# Patient Record
Sex: MALE | Race: WHITE | NOT HISPANIC OR LATINO | Employment: UNEMPLOYED | URBAN - METROPOLITAN AREA
[De-identification: names, ages, dates, MRNs, and addresses within clinical notes are randomized per-mention and may not be internally consistent; named-entity substitution may affect disease eponyms.]

---

## 2018-03-06 ENCOUNTER — CONVERSION ENCOUNTER (OUTPATIENT)
Dept: GASTROENTEROLOGY | Facility: CLINIC | Age: 33
End: 2018-03-06

## 2018-03-06 ENCOUNTER — OFFICE VISIT CONVERTED (OUTPATIENT)
Dept: GASTROENTEROLOGY | Facility: CLINIC | Age: 33
End: 2018-03-06
Attending: INTERNAL MEDICINE

## 2018-08-06 ENCOUNTER — OFFICE VISIT CONVERTED (OUTPATIENT)
Dept: GASTROENTEROLOGY | Facility: CLINIC | Age: 33
End: 2018-08-06
Attending: INTERNAL MEDICINE

## 2018-08-06 ENCOUNTER — CONVERSION ENCOUNTER (OUTPATIENT)
Dept: GASTROENTEROLOGY | Facility: CLINIC | Age: 33
End: 2018-08-06

## 2018-09-02 ENCOUNTER — APPOINTMENT (OUTPATIENT)
Dept: GENERAL RADIOLOGY | Facility: HOSPITAL | Age: 33
End: 2018-09-02

## 2018-09-02 ENCOUNTER — HOSPITAL ENCOUNTER (EMERGENCY)
Facility: HOSPITAL | Age: 33
Discharge: HOME | End: 2018-09-02
Attending: EMERGENCY MEDICINE | Admitting: EMERGENCY MEDICINE

## 2018-09-02 VITALS
SYSTOLIC BLOOD PRESSURE: 101 MMHG | OXYGEN SATURATION: 100 % | HEART RATE: 102 BPM | RESPIRATION RATE: 18 BRPM | BODY MASS INDEX: 20.73 KG/M2 | DIASTOLIC BLOOD PRESSURE: 58 MMHG | TEMPERATURE: 98.3 F | WEIGHT: 140 LBS | HEIGHT: 69 IN

## 2018-09-02 DIAGNOSIS — R06.02 SHORTNESS OF BREATH: Primary | ICD-10-CM

## 2018-09-02 DIAGNOSIS — F41.9 ANXIETY: ICD-10-CM

## 2018-09-02 LAB
ALBUMIN SERPL-MCNC: 5.2 G/DL (ref 3.5–5.2)
ALBUMIN/GLOB SERPL: 1.6 G/DL
ALP SERPL-CCNC: 103 U/L (ref 39–117)
ALT SERPL W P-5'-P-CCNC: 34 U/L (ref 1–41)
AMPHET+METHAMPHET UR QL: NEGATIVE
ANION GAP SERPL CALCULATED.3IONS-SCNC: 24.5 MMOL/L
AST SERPL-CCNC: 31 U/L (ref 1–40)
BARBITURATES UR QL SCN: NEGATIVE
BASOPHILS # BLD AUTO: 0.06 10*3/MM3 (ref 0–0.2)
BASOPHILS NFR BLD AUTO: 0.6 % (ref 0–1.5)
BENZODIAZ UR QL SCN: NEGATIVE
BILIRUB SERPL-MCNC: 0.9 MG/DL (ref 0.1–1.2)
BILIRUB UR QL STRIP: NEGATIVE
BUN BLD-MCNC: 11 MG/DL (ref 6–20)
BUN/CREAT SERPL: 10.1 (ref 7–25)
CALCIUM SPEC-SCNC: 10.3 MG/DL (ref 8.6–10.5)
CANNABINOIDS SERPL QL: NEGATIVE
CHLORIDE SERPL-SCNC: 97 MMOL/L (ref 98–107)
CLARITY UR: CLEAR
CO2 SERPL-SCNC: 17.5 MMOL/L (ref 22–29)
COCAINE UR QL: NEGATIVE
COLOR UR: YELLOW
CREAT BLD-MCNC: 1.09 MG/DL (ref 0.76–1.27)
D DIMER PPP FEU-MCNC: <0.27 MCGFEU/ML (ref 0–0.49)
DEPRECATED RDW RBC AUTO: 39 FL (ref 37–54)
EOSINOPHIL # BLD AUTO: 0.16 10*3/MM3 (ref 0–0.7)
EOSINOPHIL NFR BLD AUTO: 1.7 % (ref 0.3–6.2)
ERYTHROCYTE [DISTWIDTH] IN BLOOD BY AUTOMATED COUNT: 12.6 % (ref 11.5–14.5)
ETHANOL BLD-MCNC: 19 MG/DL (ref 0–10)
ETHANOL UR QL: 0.02 %
GFR SERPL CREATININE-BSD FRML MDRD: 79 ML/MIN/1.73
GLOBULIN UR ELPH-MCNC: 3.3 GM/DL
GLUCOSE BLD-MCNC: 73 MG/DL (ref 65–99)
GLUCOSE UR STRIP-MCNC: NEGATIVE MG/DL
HCT VFR BLD AUTO: 41.6 % (ref 40.4–52.2)
HGB BLD-MCNC: 14.3 G/DL (ref 13.7–17.6)
HGB UR QL STRIP.AUTO: NEGATIVE
IMM GRANULOCYTES # BLD: 0.03 10*3/MM3 (ref 0–0.03)
IMM GRANULOCYTES NFR BLD: 0.3 % (ref 0–0.5)
KETONES UR QL STRIP: ABNORMAL
LEUKOCYTE ESTERASE UR QL STRIP.AUTO: NEGATIVE
LYMPHOCYTES # BLD AUTO: 1.71 10*3/MM3 (ref 0.9–4.8)
LYMPHOCYTES NFR BLD AUTO: 17.7 % (ref 19.6–45.3)
MCH RBC QN AUTO: 29.4 PG (ref 27–32.7)
MCHC RBC AUTO-ENTMCNC: 34.4 G/DL (ref 32.6–36.4)
MCV RBC AUTO: 85.4 FL (ref 79.8–96.2)
METHADONE UR QL SCN: NEGATIVE
MONOCYTES # BLD AUTO: 0.8 10*3/MM3 (ref 0.2–1.2)
MONOCYTES NFR BLD AUTO: 8.3 % (ref 5–12)
NEUTROPHILS # BLD AUTO: 6.95 10*3/MM3 (ref 1.9–8.1)
NEUTROPHILS NFR BLD AUTO: 71.7 % (ref 42.7–76)
NITRITE UR QL STRIP: NEGATIVE
OPIATES UR QL: NEGATIVE
OXYCODONE UR QL SCN: NEGATIVE
PH UR STRIP.AUTO: <=5 [PH] (ref 5–8)
PLATELET # BLD AUTO: 343 10*3/MM3 (ref 140–500)
PMV BLD AUTO: 10.5 FL (ref 6–12)
POTASSIUM BLD-SCNC: 3.4 MMOL/L (ref 3.5–5.2)
PROT SERPL-MCNC: 8.5 G/DL (ref 6–8.5)
PROT UR QL STRIP: NEGATIVE
RBC # BLD AUTO: 4.87 10*6/MM3 (ref 4.6–6)
SODIUM BLD-SCNC: 139 MMOL/L (ref 136–145)
SP GR UR STRIP: 1.01 (ref 1–1.03)
UROBILINOGEN UR QL STRIP: ABNORMAL
WBC NRBC COR # BLD: 9.68 10*3/MM3 (ref 4.5–10.7)

## 2018-09-02 PROCEDURE — 96361 HYDRATE IV INFUSION ADD-ON: CPT

## 2018-09-02 PROCEDURE — 80307 DRUG TEST PRSMV CHEM ANLYZR: CPT | Performed by: NURSE PRACTITIONER

## 2018-09-02 PROCEDURE — 85025 COMPLETE CBC W/AUTO DIFF WBC: CPT | Performed by: NURSE PRACTITIONER

## 2018-09-02 PROCEDURE — 71045 X-RAY EXAM CHEST 1 VIEW: CPT

## 2018-09-02 PROCEDURE — 94640 AIRWAY INHALATION TREATMENT: CPT

## 2018-09-02 PROCEDURE — 99285 EMERGENCY DEPT VISIT HI MDM: CPT

## 2018-09-02 PROCEDURE — 85379 FIBRIN DEGRADATION QUANT: CPT | Performed by: NURSE PRACTITIONER

## 2018-09-02 PROCEDURE — 80053 COMPREHEN METABOLIC PANEL: CPT | Performed by: NURSE PRACTITIONER

## 2018-09-02 PROCEDURE — 94799 UNLISTED PULMONARY SVC/PX: CPT

## 2018-09-02 PROCEDURE — 81003 URINALYSIS AUTO W/O SCOPE: CPT | Performed by: NURSE PRACTITIONER

## 2018-09-02 PROCEDURE — 96374 THER/PROPH/DIAG INJ IV PUSH: CPT

## 2018-09-02 PROCEDURE — 93010 ELECTROCARDIOGRAM REPORT: CPT | Performed by: INTERNAL MEDICINE

## 2018-09-02 PROCEDURE — 93005 ELECTROCARDIOGRAM TRACING: CPT | Performed by: NURSE PRACTITIONER

## 2018-09-02 RX ORDER — ALBUTEROL SULFATE 90 UG/1
2 AEROSOL, METERED RESPIRATORY (INHALATION) EVERY 4 HOURS PRN
COMMUNITY

## 2018-09-02 RX ORDER — ALBUTEROL SULFATE 2.5 MG/3ML
2.5 SOLUTION RESPIRATORY (INHALATION) ONCE
Status: COMPLETED | OUTPATIENT
Start: 2018-09-02 | End: 2018-09-02

## 2018-09-02 RX ORDER — FAMOTIDINE 10 MG/ML
20 INJECTION, SOLUTION INTRAVENOUS ONCE
Status: COMPLETED | OUTPATIENT
Start: 2018-09-02 | End: 2018-09-02

## 2018-09-02 RX ORDER — SODIUM CHLORIDE 0.9 % (FLUSH) 0.9 %
10 SYRINGE (ML) INJECTION AS NEEDED
Status: DISCONTINUED | OUTPATIENT
Start: 2018-09-02 | End: 2018-09-02 | Stop reason: HOSPADM

## 2018-09-02 RX ORDER — LORAZEPAM 1 MG/1
1 TABLET ORAL ONCE
Status: COMPLETED | OUTPATIENT
Start: 2018-09-02 | End: 2018-09-02

## 2018-09-02 RX ADMIN — ALBUTEROL SULFATE 2.5 MG: 2.5 SOLUTION RESPIRATORY (INHALATION) at 12:28

## 2018-09-02 RX ADMIN — FAMOTIDINE 20 MG: 10 INJECTION INTRAVENOUS at 12:23

## 2018-09-02 RX ADMIN — LORAZEPAM 1 MG: 1 TABLET ORAL at 12:37

## 2018-09-02 RX ADMIN — SODIUM CHLORIDE 1000 ML: 9 INJECTION, SOLUTION INTRAVENOUS at 12:04

## 2018-09-02 NOTE — ED NOTES
"Patient in room 23.  Patient c/o SOB, patient states, \"i just don't feel good, last night I drank alcohol and smoked some weed and after I started feeling like this.\"  Patient denies any other drug use.   Patient appears restless, belching, constantly moving arms/twiching, lip smacking, keeps eyes closed.  Patient VSS, call light within reach, patient alert, cooperative, oriented x4.           Dominga Koo, RN  09/02/18 1222    "

## 2018-09-02 NOTE — ED PROVIDER NOTES
EMERGENCY DEPARTMENT ENCOUNTER    CHIEF COMPLAINT  Chief Complaint: SOA  History given by: patient  History limited by: poor historian  Room Number: 23/23  PMD: Dahlia Del Castillo PA-C      HPI:  Pt is a 33 y.o. male with a history of asthma, who presents complaining of SOA that began yesterday. Pt states that his SOA became worse this morning. Pt denies fever, cough but complains of sinus drainage. Pt states that he is watching his sister's dog and forgot his inhaler at home.    Duration:  2 days  Onset: gradual  Timing: constant  Quality: SOA  Intensity/Severity: moderate to severe  Progression: worsening  Associated Symptoms: sinus drainage  Aggravating Factors: none  Alleviating Factors: none  Treatment before arrival: none    PAST MEDICAL HISTORY  Active Ambulatory Problems     Diagnosis Date Noted   • No Active Ambulatory Problems     Resolved Ambulatory Problems     Diagnosis Date Noted   • No Resolved Ambulatory Problems     Past Medical History:   Diagnosis Date   • Asthma    • Tachycardia        PAST SURGICAL HISTORY  History reviewed. No pertinent surgical history.    FAMILY HISTORY  History reviewed. No pertinent family history.    SOCIAL HISTORY  Social History     Social History   • Marital status: Single     Spouse name: N/A   • Number of children: N/A   • Years of education: N/A     Occupational History   • Not on file.     Social History Main Topics   • Smoking status: Never Smoker   • Smokeless tobacco: Not on file   • Alcohol use Yes   • Drug use: Unknown   • Sexual activity: Not on file     Other Topics Concern   • Not on file     Social History Narrative   • No narrative on file       ALLERGIES  Patient has no known allergies.    REVIEW OF SYSTEMS  Review of Systems   Constitutional: Negative for activity change, appetite change and fever.   HENT: Negative for congestion and sore throat.         Sinus drainage   Eyes: Negative.    Respiratory: Positive for shortness of breath. Negative for cough.     Cardiovascular: Negative for chest pain and leg swelling.   Gastrointestinal: Negative for abdominal pain, diarrhea and vomiting.   Endocrine: Negative.    Genitourinary: Negative for decreased urine volume and dysuria.   Musculoskeletal: Negative for neck pain.   Skin: Negative for rash and wound.   Allergic/Immunologic: Negative.    Neurological: Negative for weakness, numbness and headaches.   Hematological: Negative.    Psychiatric/Behavioral: Negative.    All other systems reviewed and are negative.      PHYSICAL EXAM  ED Triage Vitals   Temp Heart Rate Resp BP SpO2   09/02/18 1155 09/02/18 1131 09/02/18 1152 09/02/18 1131 09/02/18 1131   98.3 °F (36.8 °C) 100 (!) 30 128/83 98 %      Temp src Heart Rate Source Patient Position BP Location FiO2 (%)   09/02/18 1155 09/02/18 1131 -- -- --   Oral Monitor          Physical Exam   Constitutional: He is oriented to person, place, and time. No distress.   Pt has dyspepsia and repetitively rubs his head.   HENT:   Head: Normocephalic and atraumatic.   Eyes: Pupils are equal, round, and reactive to light. EOM are normal.   Neck: Normal range of motion. Neck supple.   Cardiovascular: Regular rhythm and normal heart sounds.  Tachycardia present.    Pulmonary/Chest: Effort normal and breath sounds normal. Tachypnea (hyperventilating) noted. No respiratory distress.   Abdominal: Soft. There is no tenderness. There is no rebound and no guarding.   Musculoskeletal: Normal range of motion. He exhibits no edema.   Neurological: He is alert and oriented to person, place, and time. He has normal sensation and normal strength.   Skin: Skin is warm and dry.   Psychiatric: His mood appears anxious.   Pt has poor eye contact   Nursing note and vitals reviewed.      LAB RESULTS  Lab Results (last 24 hours)     Procedure Component Value Units Date/Time    CBC & Differential [[id number]] Collected:  09/02/18 1203    Specimen:  Blood Updated:  09/02/18 1216    Narrative:       The  following orders were created for panel order CBC & Differential.  Procedure                               Abnormality         Status                     ---------                               -----------         ------                     CBC Auto Differential[[id number]]        Abnormal            Final result                 Please view results for these tests on the individual orders.    Comprehensive Metabolic Panel [[id number]]  (Abnormal) Collected:  09/02/18 1203    Specimen:  Blood from Arm, Left Updated:  09/02/18 1238     Glucose 73 mg/dL      BUN 11 mg/dL      Creatinine 1.09 mg/dL      Sodium 139 mmol/L      Potassium 3.4 (L) mmol/L      Chloride 97 (L) mmol/L      CO2 17.5 (L) mmol/L      Calcium 10.3 mg/dL      Total Protein 8.5 g/dL      Albumin 5.20 g/dL      ALT (SGPT) 34 U/L      AST (SGOT) 31 U/L      Alkaline Phosphatase 103 U/L      Total Bilirubin 0.9 mg/dL      eGFR Non African Amer 79 mL/min/1.73      Globulin 3.3 gm/dL      A/G Ratio 1.6 g/dL      BUN/Creatinine Ratio 10.1     Anion Gap 24.5 mmol/L     D-dimer, Quantitative [[id number]]  (Normal) Collected:  09/02/18 1203    Specimen:  Blood from Arm, Left Updated:  09/02/18 1234     D-Dimer, Quantitative <0.27 MCGFEU/mL     Narrative:       The [employer] D-Dimer test used in conjunction with a clinical pretest probability (PTP) assessment model, has been approved by the FDA to rule out the presence of venous thromboembolism (VTE) in outpatients suspected of deep venous thrombosis (DVT) or pulmonary embolism (PE).     Ethanol [[id number]]  (Abnormal) Collected:  09/02/18 1203    Specimen:  Blood from Arm, Left Updated:  09/02/18 1238     Ethanol 19 (H) mg/dL      Ethanol % 0.019 %     CBC Auto Differential [[id number]]  (Abnormal) Collected:  09/02/18 1203    Specimen:  Blood from Arm, Left Updated:  09/02/18 1216     WBC 9.68 10*3/mm3      RBC 4.87 10*6/mm3      Hemoglobin 14.3 g/dL      Hematocrit 41.6 %      MCV 85.4 fL      MCH 29.4 pg       MCHC 34.4 g/dL      RDW 12.6 %      RDW-SD 39.0 fl      MPV 10.5 fL      Platelets 343 10*3/mm3      Neutrophil % 71.7 %      Lymphocyte % 17.7 (L) %      Monocyte % 8.3 %      Eosinophil % 1.7 %      Basophil % 0.6 %      Immature Grans % 0.3 %      Neutrophils, Absolute 6.95 10*3/mm3      Lymphocytes, Absolute 1.71 10*3/mm3      Monocytes, Absolute 0.80 10*3/mm3      Eosinophils, Absolute 0.16 10*3/mm3      Basophils, Absolute 0.06 10*3/mm3      Immature Grans, Absolute 0.03 10*3/mm3     Urinalysis With Microscopic If Indicated (No Culture) - Urine, Clean Catch [[id number]]  (Abnormal) Collected:  09/02/18 1318    Specimen:  Urine from Urine, Clean Catch Updated:  09/02/18 1351     Color, UA Yellow     Appearance, UA Clear     pH, UA <=5.0     Specific Gravity, UA 1.008     Glucose, UA Negative     Ketones, UA 40 mg/dL (2+) (A)     Bilirubin, UA Negative     Blood, UA Negative     Protein, UA Negative     Leuk Esterase, UA Negative     Nitrite, UA Negative     Urobilinogen, UA 0.2 E.U./dL    Narrative:       Urine microscopic not indicated.    Urine Drug Screen - Urine, Clean Catch [[id number]]  (Normal) Collected:  09/02/18 1318    Specimen:  Urine from Urine, Clean Catch Updated:  09/02/18 1407     Amphet/Methamphet, Screen Negative     Barbiturates Screen, Urine Negative     Benzodiazepine Screen, Urine Negative     Cocaine Screen, Urine Negative     Opiate Screen Negative     THC, Screen, Urine Negative     Methadone Screen, Urine Negative     Oxycodone Screen, Urine Negative    Narrative:       Negative Thresholds For Drugs Screened:     Amphetamines               500 ng/ml   Barbiturates               200 ng/ml   Benzodiazepines            100 ng/ml   Cocaine                    300 ng/ml   Methadone                  300 ng/ml   Opiates                    300 ng/ml   Oxycodone                  100 ng/ml   THC                        50 ng/ml    The Normal Value for all drugs tested is negative. This report  includes final unconfirmed screening results to be used for medical treatment purposes only. Unconfirmed results must not be used for non-medical purposes such as employment or legal testing. Clinical consideration should be applied to any drug of abuse test, particulary when unconfirmed results are used.          I ordered the above labs and reviewed the results    RADIOLOGY  XR Chest 1 View   Final Result   No focal pulmonary consolidation. Follow-up as clinical   indications persist.       This report was finalized on 9/2/2018 12:24 PM by Dr. Sourav Rivera M.D.               I ordered the above noted radiological studies. Interpreted by radiologist. Reviewed by me in PACS.       PROCEDURES  Procedures  EKG           EKG time: 1222  Rhythm/Rate: NSR, 93  P waves and KS: normal  QRS, axis: normal   ST and T waves: normal     Interpreted Contemporaneously by me, independently viewed  No prior for comparison    PROGRESS AND CONSULTS  ED Course as of Sep 02 1437   Sun Sep 02, 2018   1156 To ER complaining of asthma attack, has not used inhaler.  Bizzare behavior.   [EP]      ED Course User Index  [EP] Yelitza Tam, APRN     1213- Discussed the plan to order lab and imaging studies for further evaluation. Pt understands and agrees with the plan, all questions answered.    1216- Ordered Ativan for anxiety.    1430- Rechecked pt. Pt is resting comfortably and appears calmer at this time. Notified pt of his unremarkable lab and imaging results, including the pt's CXR. Discussed the plan to discharge the pt home. Pt understands and agrees with the plan, all questions answered.      MEDICAL DECISION MAKING  Results were reviewed/discussed with the patient and they were also made aware of online access. Pt also made aware that some labs, such as cultures, will not be resulted during ER visit and follow up with PMD is necessary.     MDM  Number of Diagnoses or Management Options  Anxiety:   Shortness of breath:       Amount and/or Complexity of Data Reviewed  Clinical lab tests: ordered and reviewed (UDS is negative, ethanol=19)  Tests in the radiology section of CPT®: ordered and reviewed (CXR shows nothing acute)  Tests in the medicine section of CPT®: ordered and reviewed (See EKG procedure note)  Independent visualization of images, tracings, or specimens: yes    Patient Progress  Patient progress: stable         DIAGNOSIS  Final diagnoses:   Shortness of breath   Anxiety       DISPOSITION  DISCHARGE    Patient discharged in stable condition.    Reviewed implications of results, diagnosis, meds, responsibility to follow up, warning signs and symptoms of possible worsening, potential complications and reasons to return to ER.    Patient/Family voiced understanding of above instructions.    Discussed plan for discharge, as there is no emergent indication for admission. Patient referred to primary care provider for BP management due to today's BP. Pt/family is agreeable and understands need for follow up and repeat testing.  Pt is aware that discharge does not mean that nothing is wrong but it indicates no emergency is present that requires admission and they must continue care with follow-up as given below or physician of their choice.     FOLLOW-UP  Dahlia Del Castillo PA-C  [address] [place] [id number]  [phone]               Medication List      No changes were made to your prescriptions during this visit.           Latest Documented Vital Signs:  As of 2:37 PM  BP- 103/59 HR- 89 Temp- 98.3 °F (36.8 °C) (Oral) O2 sat- 97%    --  Documentation assistance provided by bruce Wallace for Dr. Ardon.  Information recorded by the scrnatasha was done at my direction and has been verified and validated by me.     Carol Wallace  09/02/18 3273       Randolph Ardon MD  09/02/18 9720

## 2018-09-02 NOTE — ED NOTES
"Pt given urinal and asked to provide a sample. Pt. states, \"I will try\".     Maiclo Jacobo RN  09/02/18 1312    "

## 2021-05-16 VITALS
DIASTOLIC BLOOD PRESSURE: 69 MMHG | HEIGHT: 69 IN | SYSTOLIC BLOOD PRESSURE: 105 MMHG | BODY MASS INDEX: 20.03 KG/M2 | WEIGHT: 135.25 LBS

## 2021-05-16 VITALS
HEIGHT: 69 IN | BODY MASS INDEX: 19.59 KG/M2 | SYSTOLIC BLOOD PRESSURE: 118 MMHG | DIASTOLIC BLOOD PRESSURE: 77 MMHG | WEIGHT: 132.25 LBS

## 2021-06-25 ENCOUNTER — HOSPITAL ENCOUNTER (EMERGENCY)
Facility: HOSPITAL | Age: 36
Discharge: HOME | End: 2021-06-26
Attending: EMERGENCY MEDICINE | Admitting: EMERGENCY MEDICINE

## 2021-06-25 DIAGNOSIS — R06.00 DYSPNEA, UNSPECIFIED TYPE: ICD-10-CM

## 2021-06-25 DIAGNOSIS — R00.2 PALPITATION: Primary | ICD-10-CM

## 2021-06-25 DIAGNOSIS — F12.90 MARIJUANA USE: ICD-10-CM

## 2021-06-25 PROCEDURE — 99284 EMERGENCY DEPT VISIT MOD MDM: CPT

## 2021-06-25 PROCEDURE — 93005 ELECTROCARDIOGRAM TRACING: CPT

## 2021-06-25 PROCEDURE — 99283 EMERGENCY DEPT VISIT LOW MDM: CPT

## 2021-06-25 PROCEDURE — 93005 ELECTROCARDIOGRAM TRACING: CPT | Performed by: EMERGENCY MEDICINE

## 2021-06-26 ENCOUNTER — APPOINTMENT (OUTPATIENT)
Dept: GENERAL RADIOLOGY | Facility: HOSPITAL | Age: 36
End: 2021-06-26

## 2021-06-26 VITALS
WEIGHT: 126.98 LBS | DIASTOLIC BLOOD PRESSURE: 75 MMHG | BODY MASS INDEX: 18.81 KG/M2 | TEMPERATURE: 98.6 F | HEART RATE: 62 BPM | HEIGHT: 69 IN | RESPIRATION RATE: 20 BRPM | OXYGEN SATURATION: 95 % | SYSTOLIC BLOOD PRESSURE: 98 MMHG

## 2021-06-26 LAB
ALBUMIN SERPL-MCNC: 4.7 G/DL (ref 3.5–5.2)
ALBUMIN/GLOB SERPL: 2 G/DL
ALP SERPL-CCNC: 62 U/L (ref 39–117)
ALT SERPL W P-5'-P-CCNC: 20 U/L (ref 1–41)
AMPHET+METHAMPHET UR QL: NEGATIVE
ANION GAP SERPL CALCULATED.3IONS-SCNC: 11.9 MMOL/L (ref 5–15)
AST SERPL-CCNC: 24 U/L (ref 1–40)
BARBITURATES UR QL SCN: NEGATIVE
BASOPHILS # BLD AUTO: 0.06 10*3/MM3 (ref 0–0.2)
BASOPHILS NFR BLD AUTO: 0.9 % (ref 0–1.5)
BENZODIAZ UR QL SCN: NEGATIVE
BILIRUB SERPL-MCNC: 0.6 MG/DL (ref 0–1.2)
BUN SERPL-MCNC: 8 MG/DL (ref 6–20)
BUN/CREAT SERPL: 9.4 (ref 7–25)
CALCIUM SPEC-SCNC: 9.7 MG/DL (ref 8.6–10.5)
CANNABINOIDS SERPL QL: POSITIVE
CHLORIDE SERPL-SCNC: 106 MMOL/L (ref 98–107)
CO2 SERPL-SCNC: 23.1 MMOL/L (ref 22–29)
COCAINE UR QL: NEGATIVE
CREAT SERPL-MCNC: 0.85 MG/DL (ref 0.76–1.27)
DEPRECATED RDW RBC AUTO: 40.1 FL (ref 37–54)
EOSINOPHIL # BLD AUTO: 0.08 10*3/MM3 (ref 0–0.4)
EOSINOPHIL NFR BLD AUTO: 1.3 % (ref 0.3–6.2)
ERYTHROCYTE [DISTWIDTH] IN BLOOD BY AUTOMATED COUNT: 12.7 % (ref 12.3–15.4)
GFR SERPL CREATININE-BSD FRML MDRD: 103 ML/MIN/1.73
GLOBULIN UR ELPH-MCNC: 2.3 GM/DL
GLUCOSE SERPL-MCNC: 97 MG/DL (ref 65–99)
HCT VFR BLD AUTO: 39.8 % (ref 37.5–51)
HGB BLD-MCNC: 13.4 G/DL (ref 13–17.7)
IMM GRANULOCYTES # BLD AUTO: 0.01 10*3/MM3 (ref 0–0.05)
IMM GRANULOCYTES NFR BLD AUTO: 0.2 % (ref 0–0.5)
LYMPHOCYTES # BLD AUTO: 1.09 10*3/MM3 (ref 0.7–3.1)
LYMPHOCYTES NFR BLD AUTO: 17.2 % (ref 19.6–45.3)
MAGNESIUM SERPL-MCNC: 2.1 MG/DL (ref 1.6–2.6)
MCH RBC QN AUTO: 29.5 PG (ref 26.6–33)
MCHC RBC AUTO-ENTMCNC: 33.7 G/DL (ref 31.5–35.7)
MCV RBC AUTO: 87.7 FL (ref 79–97)
METHADONE UR QL SCN: NEGATIVE
MONOCYTES # BLD AUTO: 0.55 10*3/MM3 (ref 0.1–0.9)
MONOCYTES NFR BLD AUTO: 8.7 % (ref 5–12)
NEUTROPHILS NFR BLD AUTO: 4.56 10*3/MM3 (ref 1.7–7)
NEUTROPHILS NFR BLD AUTO: 71.7 % (ref 42.7–76)
NRBC BLD AUTO-RTO: 0 /100 WBC (ref 0–0.2)
OPIATES UR QL: NEGATIVE
OXYCODONE UR QL SCN: NEGATIVE
PLATELET # BLD AUTO: 268 10*3/MM3 (ref 140–450)
PMV BLD AUTO: 9.9 FL (ref 6–12)
POTASSIUM SERPL-SCNC: 4.1 MMOL/L (ref 3.5–5.2)
PROT SERPL-MCNC: 7 G/DL (ref 6–8.5)
RBC # BLD AUTO: 4.54 10*6/MM3 (ref 4.14–5.8)
SODIUM SERPL-SCNC: 141 MMOL/L (ref 136–145)
T4 FREE SERPL-MCNC: 1.53 NG/DL (ref 0.93–1.7)
TSH SERPL DL<=0.05 MIU/L-ACNC: 1.5 UIU/ML (ref 0.27–4.2)
WBC # BLD AUTO: 6.35 10*3/MM3 (ref 3.4–10.8)

## 2021-06-26 PROCEDURE — 71045 X-RAY EXAM CHEST 1 VIEW: CPT

## 2021-06-26 PROCEDURE — 80307 DRUG TEST PRSMV CHEM ANLYZR: CPT | Performed by: EMERGENCY MEDICINE

## 2021-06-26 PROCEDURE — 84439 ASSAY OF FREE THYROXINE: CPT | Performed by: EMERGENCY MEDICINE

## 2021-06-26 PROCEDURE — 83735 ASSAY OF MAGNESIUM: CPT | Performed by: EMERGENCY MEDICINE

## 2021-06-26 PROCEDURE — 80053 COMPREHEN METABOLIC PANEL: CPT | Performed by: EMERGENCY MEDICINE

## 2021-06-26 PROCEDURE — 84443 ASSAY THYROID STIM HORMONE: CPT | Performed by: EMERGENCY MEDICINE

## 2021-06-26 PROCEDURE — 85025 COMPLETE CBC W/AUTO DIFF WBC: CPT | Performed by: EMERGENCY MEDICINE

## 2021-06-27 LAB — QT INTERVAL: 359 MS

## 2021-11-16 PROBLEM — K51.90 ULCERATIVE COLITIS (HCC): Status: ACTIVE | Noted: 2021-11-16

## 2021-11-16 PROBLEM — F32.A ANXIETY AND DEPRESSION: Status: ACTIVE | Noted: 2021-11-16

## 2021-11-16 PROBLEM — J45.909 ASTHMA: Status: ACTIVE | Noted: 2021-11-16

## 2021-11-16 PROBLEM — F41.9 ANXIETY AND DEPRESSION: Status: ACTIVE | Noted: 2021-11-16

## 2021-11-16 PROBLEM — G90.A POTS (POSTURAL ORTHOSTATIC TACHYCARDIA SYNDROME): Status: ACTIVE | Noted: 2021-11-16

## 2021-11-16 NOTE — PATIENT INSTRUCTIONS
Palpitations  Palpitations are feelings that your heartbeat is not normal. Your heartbeat may feel like it is:  · Uneven.  · Faster than normal.  · Fluttering.  · Skipping a beat.  This is usually not a serious problem. In some cases, you may need tests to rule out any serious problems.  Follow these instructions at home:  Pay attention to any changes in your condition. Take these actions to help manage your symptoms:  Eating and drinking  · Avoid:  ? Coffee, tea, soft drinks, and energy drinks.  ? Chocolate.  ? Alcohol.  ? Diet pills.  Lifestyle    · Try to lower your stress. These things can help you relax:  ? Yoga.  ? Deep breathing and meditation.  ? Exercise.  ? Using words and images to create positive thoughts (guided imagery).  ? Using your mind to control things in your body (biofeedback).  · Do not use drugs.  · Get plenty of rest and sleep. Keep a regular bed time.    General instructions    · Take over-the-counter and prescription medicines only as told by your doctor.  · Do not use any products that contain nicotine or tobacco, such as cigarettes and e-cigarettes. If you need help quitting, ask your doctor.  · Keep all follow-up visits as told by your doctor. This is important. You may need more tests if palpitations do not go away or get worse.    Contact a doctor if:  · Your symptoms last more than 24 hours.  · Your symptoms occur more often.  Get help right away if you:  · Have chest pain.  · Feel short of breath.  · Have a very bad headache.  · Feel dizzy.  · Pass out (faint).  Summary  · Palpitations are feelings that your heartbeat is uneven or faster than normal. It may feel like your heart is fluttering or skipping a beat.  · Avoid food and drinks that may cause palpitations. These include caffeine, chocolate, and alcohol.  · Try to lower your stress. Do not smoke or use drugs.  · Get help right away if you faint or have chest pain, shortness of breath, a severe headache, or dizziness.  This  information is not intended to replace advice given to you by your health care provider. Make sure you discuss any questions you have with your health care provider.  Document Revised: 01/30/2019 Document Reviewed: 01/30/2019  [employer] Patient Education © 2021 [employer]      Postural Orthostatic Tachycardia Syndrome  Postural orthostatic tachycardia syndrome (POTS) is a group of symptoms that occur when a person stands up after lying down. POTS occurs when less blood than normal flows to the body when you stand up. The reduced blood flow to the body makes the heart beat rapidly.  POTS may be associated with another medical condition, or it may occur on its own.  What are the causes?  The cause of this condition is not known, but many conditions and diseases are associated with it.  What increases the risk?  This condition is more likely to develop in:  · Women 15-50 years old.  · Women who are pregnant.  · Women who are in their period (menstruating).  · People who have certain conditions, such as:  ? Infection from a virus.  ? Attacks of healthy organs by the body's immunity (autoimmune disease).  ? Losing a lot of red blood cells (anemia).  ? Losing too much water in the body (dehydration).  ? An overactive thyroid (hyperthyroidism).  · People who take certain medicines.  · People who have had a major injury.  · People who have had surgery.  What are the signs or symptoms?  The most common symptom of this condition is light-headedness when one stands from a lying or sitting position. Other symptoms may include:  · Feeling a rapid increase in the heartbeat (tachycardia) within 10 minutes of standing up.  · Fainting.  · Weakness.  · Confusion.  · Trembling.  · Shortness of breath.  · Sweating or flushing.  · Headache.  · Chest pain.  · Breathing that is deeper and faster than normal (hyperventilation).  · Nausea.  · Anxiety.  Symptoms may be worse in the morning, and they may be relieved by lying down.  How is  this diagnosed?  This condition is diagnosed based on:  · Your symptoms.  · Your medical history.  · A physical exam.  · Checking your heart rate when you are lying down and after you stand up.  · Checking your blood pressure when you go from lying down to standing up.  · Blood tests to measure hormones that change with blood pressure. The blood tests will be done when you are lying down and when you are standing up.  You may have other tests to check for conditions or diseases that are associated with POTS.  How is this treated?  Treatment for this condition depends on how severe your symptoms are and whether you have any conditions or diseases that are associated with POTS. Treatment may involve:  · Treating any conditions or diseases that are associated with POTS.  · Drinking two glasses of water before getting up from a lying position.  · Eating more salt (sodium).  · Taking medicine to control blood pressure and heart rate (beta-blocker).  · Avoiding certain medicines.  · Starting an exercise program under the supervision of a health care provider.  Follow these instructions at home:  Medicines  · Take over-the-counter and prescription medicines only as told by your health care provider.  · Let your health care provider know about all prescription or over-the-counter medicines. These include herbs, vitamins, and supplements. You may need to stop or adjust some medicines if they cause this condition.  · Talk with your health care provider before starting any new medicines.  Eating and drinking    · Drink enough fluid to keep your urine pale yellow.  · If told by your health care provider, drink two glasses of water before getting up from a lying position.  · Follow instructions from your health care provider about how much sodium you should eat.  · Avoid heavy meals. Eat several small meals a day instead of a few large meals.    General instructions  · Do an aerobic exercise for 20 minutes a day, at least 3 days  a week.  · Ask your health care provider what kinds of exercise are safe for you.  · Do not use any products that contain nicotine or tobacco, such as cigarettes and e-cigarettes. These can interfere with blood flow. If you need help quitting, ask your health care provider.  · Keep all follow-up visits as told by your health care provider. This is important.  Contact a health care provider if:  · Your symptoms do not improve after treatment.  · Your symptoms get worse.  · You develop new symptoms.  Get help right away if:  · You have chest pain.  · You have difficulty breathing.  · You have fainting episodes.  These symptoms may represent a serious problem that is an emergency. Do not wait to see if the symptoms will go away. Get medical help right away. Call your local emergency services (911 in the [place] Do not drive yourself to the hospital.  Summary  · POTS is a condition that can cause light-headedness, fainting, and palpitations when you go from a sitting or lying position to a standing position. It occurs when less blood than normal flows to the body when you stand up.  · Treatment for this condition includes treating any underlying conditions, drinking plenty of water, stopping or changing some medicines, or starting an exercise program.  · Get help right away if you have chest pain, difficulty breathing, or fainting episodes. These may represent a serious problem that is an emergency.  This information is not intended to replace advice given to you by your health care provider. Make sure you discuss any questions you have with your health care provider.  Document Revised: 01/29/2019 Document Reviewed: 01/29/2019  [employer] Patient Education © 2021 [employer]

## 2021-11-17 ENCOUNTER — OFFICE VISIT (OUTPATIENT)
Dept: CARDIOLOGY | Facility: CLINIC | Age: 36
End: 2021-11-17

## 2021-11-17 VITALS
WEIGHT: 124 LBS | DIASTOLIC BLOOD PRESSURE: 74 MMHG | BODY MASS INDEX: 18.37 KG/M2 | SYSTOLIC BLOOD PRESSURE: 111 MMHG | HEART RATE: 90 BPM | HEIGHT: 69 IN

## 2021-11-17 DIAGNOSIS — R00.0 TACHYCARDIA: ICD-10-CM

## 2021-11-17 DIAGNOSIS — G90.A POTS (POSTURAL ORTHOSTATIC TACHYCARDIA SYNDROME): ICD-10-CM

## 2021-11-17 DIAGNOSIS — R00.2 PALPITATIONS: ICD-10-CM

## 2021-11-17 DIAGNOSIS — R06.09 DOE (DYSPNEA ON EXERTION): Primary | ICD-10-CM

## 2021-11-17 PROCEDURE — 99203 OFFICE O/P NEW LOW 30 MIN: CPT | Performed by: INTERNAL MEDICINE

## 2021-11-17 PROCEDURE — 93000 ELECTROCARDIOGRAM COMPLETE: CPT | Performed by: INTERNAL MEDICINE

## 2021-11-17 RX ORDER — VITAMIN B COMPLEX
1 CAPSULE ORAL DAILY
COMMUNITY

## 2021-11-17 RX ORDER — METOPROLOL SUCCINATE 25 MG/1
25 TABLET, EXTENDED RELEASE ORAL DAILY
Qty: 90 TABLET | Refills: 3 | Status: SHIPPED | OUTPATIENT
Start: 2021-11-17 | End: 2022-11-12

## 2021-11-17 NOTE — ASSESSMENT & PLAN NOTE
Suspect from deconditioning and POTS related will check echocardiogram to evaluate for any LV dysfunction

## 2021-11-17 NOTE — ASSESSMENT & PLAN NOTE
Suspect this may be somewhat related to autonomic dysfunction versus true arrhythmia will get 24-hour Holter monitor and echocardiogram for further assessment of any structural heart issues.  In the meantime encourage patient on importance of exercise abstaining from alcohol use and other drugs.  Will changes to lower Toprol to see if this is more effective in controlling his symptoms and titrate up on further as needed

## 2021-11-17 NOTE — PROGRESS NOTES
Chief Complaint  Rapid Heart Rate, Shortness of Breath, and Fatigue      History of Present Illness  Feliberto Santillan presents to [facility] C[facility]   Patient is a 36-year-old gentleman with a previous history of pots who has been having symptoms of tachycardia up in the 150s unprovoked also at times symptoms of dizziness.  Patient has not had any overt syncopal episodes does report symptoms of nausea with the spells as well.  He has had shortness of breath increased with exertion and generalized fatigue problems but no PND orthopnea or lower extremity edema    Past Medical History:   Diagnosis Date   • Abnormal ECG    • Arrhythmia    • Asthma    • Hypertension    • POTS (postural orthostatic tachycardia syndrome)    • Tachycardia          Current Outpatient Medications:   •  albuterol (PROVENTIL HFA;VENTOLIN HFA) 108 (90 Base) MCG/ACT inhaler, Inhale 2 puffs Every 4 (Four) Hours As Needed for Wheezing., Disp: , Rfl:   •  Ascorbic Acid (BG-C PO), Take  by mouth Daily., Disp: , Rfl:   •  B Complex Vitamins (vitamin b complex) capsule capsule, Take  by mouth Daily., Disp: , Rfl:   •  Magnesium Chloride (MAGNESIUM DR PO), Take  by mouth Daily., Disp: , Rfl:   •  VITAMIN D PO, Take  by mouth Daily., Disp: , Rfl:   •  metoprolol succinate XL (TOPROL-XL) 25 MG 24 hr tablet, Take 1 tablet by mouth Daily., Disp: 90 tablet, Rfl: 3    Medications Discontinued During This Encounter   Medication Reason   • CBD (cannabidiol) oral oil Discontinued by another clinician   • ATENOLOL PO      Allergies   Allergen Reactions   • Haloperidol Other (See Comments)     Tardive dyskenesthia  Foaming at the mouth   • Risperidone Unknown - High Severity   • Trazodone Other (See Comments)     Stuffy nose        Social History     Tobacco Use   • Smoking status: Never Smoker   • Smokeless tobacco: Current User     Types: Snuff   Vaping Use   • Vaping Use: Never used   Substance Use Topics   • Alcohol use: Yes      "Comment: 6 PACK BEER PER WEEK   • Drug use: Not Currently     Types: Marijuana   Patient also admitted to using mushrooms recently but does not normally use drugs    Family History   Problem Relation Age of Onset   • Thyroid disease Mother    • Asthma Mother    • Hyperlipidemia Mother    • Diabetes Father    • Hypertension Father    • Heart disease Father         Objective     /74   Pulse 90   Ht 175.3 cm (69\")   Wt 56.2 kg (124 lb)   BMI 18.31 kg/m²       Physical Exam    General Appearance:   · no acute distress  · Alert and oriented x3  HENT:   · lips not cyanotic  · Atraumatic  Neck:  · No jvd   · supple  Respiratory:  · no respiratory distress  · normal breath sounds  · no rales  Cardiovascular:  · Regular rate and rhythm  · no S3, no S4   · no murmur  · no rub  Extremities  · No cyanosis  · lower extremity edema: none    Skin:   · warm, dry  · No rashes    Result Review :     No results found for: PROBNP  CMP    CMP 12/21/20 12/22/20 6/26/21   Glucose 192 (A) 80 97   BUN 11 8 8   Creatinine 1.24 (A) 0.80 0.85   eGFR Non African Am   103   Sodium 137 139 141   Potassium 3.5 3.9 4.1   Chloride 102 108 106   Calcium 9.8 9.1 9.7   Albumin 4.8 4.0 4.70   Total Bilirubin 0.96 0.53 0.6   Alkaline Phosphatase 82 73 62   AST (SGOT) 24 24 24   ALT (SGPT) 16 15 20   (A) Abnormal value            CBC w/diff    CBC w/Diff 12/21/20 12/22/20 6/26/21   WBC 12.16 (A) 10.00 6.35   RBC 4.84 4.26 (A) 4.54   Hemoglobin 14.4 12.6 (A) 13.4   Hematocrit 42.5 37.6 (A) 39.8   MCV 87.8 88.3 87.7   MCH 29.8 29.6 29.5   MCHC 33.9 33.5 33.7   RDW 12.7 12.6 12.7   Platelets 305 243 268   Neutrophil Rel % 62.9 79.3 71.7   Immature Granulocyte Rel %   0.2   Lymphocyte Rel % 24.7 12.6 (A) 17.2 (A)   Monocyte Rel % 8.6 6.9 8.7   Eosinophil Rel % 2.9 0.5 1.3   Basophil Rel % 0.7 0.4 0.9   (A) Abnormal value             Lab Results   Component Value Date    TSH 1.500 06/26/2021      Lab Results   Component Value Date    FREET4 1.53 " 06/26/2021      D-Dimer, Quantitative   Date Value Ref Range Status   09/02/2018 <0.27 0.00 - 0.49 MCGFEU/mL Final     Magnesium   Date Value Ref Range Status   06/26/2021 2.1 1.6 - 2.6 mg/dL Final      No results found for: DIGOXIN   Lab Results   Component Value Date    TROPONINT <0.01 08/23/2020      No results found for: POCTROP(              ECG 12 Lead    Date/Time: 11/17/2021 5:08 PM  Performed by: Rudolph Willis MD  Authorized by: Rudolph Willis MD   Comparison: not compared with previous ECG   Rhythm: sinus rhythm           No results found for this or any previous visit.             The ASCVD Risk score (Windsorjanis DEJESUS Jr., et al., 2013) failed to calculate for the following reasons:    The 2013 ASCVD risk score is only valid for ages 40 to 79     Diagnoses and all orders for this visit:    1. NELSON (dyspnea on exertion) (Primary)  Assessment & Plan:  Suspect from deconditioning and POTS related will check echocardiogram to evaluate for any LV dysfunction    Orders:  -     Holter Monitor - 24 Hour; Future  -     Adult Transthoracic Echo Complete W/ Cont if Necessary Per Protocol; Future    2. Tachycardia  Assessment & Plan:  Suspect this may be somewhat related to autonomic dysfunction versus true arrhythmia will get 24-hour Holter monitor and echocardiogram for further assessment of any structural heart issues.  In the meantime encourage patient on importance of exercise abstaining from alcohol use and other drugs.  Will changes to lower Toprol to see if this is more effective in controlling his symptoms and titrate up on further as needed      3. Palpitations    4. POTS (postural orthostatic tachycardia syndrome)    Other orders  -     metoprolol succinate XL (TOPROL-XL) 25 MG 24 hr tablet; Take 1 tablet by mouth Daily.  Dispense: 90 tablet; Refill: 3          Follow Up     Return in about 6 months (around 5/17/2022).          Patient was given instructions and counseling regarding his condition or for health  maintenance advice. Please see specific information pulled into the AVS if appropriate.

## 2021-11-23 ENCOUNTER — APPOINTMENT (OUTPATIENT)
Dept: GENERAL RADIOLOGY | Facility: HOSPITAL | Age: 36
End: 2021-11-23

## 2021-11-23 ENCOUNTER — HOSPITAL ENCOUNTER (EMERGENCY)
Facility: HOSPITAL | Age: 36
Discharge: HOME | End: 2021-11-23
Attending: EMERGENCY MEDICINE | Admitting: EMERGENCY MEDICINE

## 2021-11-23 VITALS
SYSTOLIC BLOOD PRESSURE: 115 MMHG | WEIGHT: 123.46 LBS | TEMPERATURE: 98.7 F | BODY MASS INDEX: 18.71 KG/M2 | RESPIRATION RATE: 19 BRPM | HEART RATE: 92 BPM | HEIGHT: 68 IN | OXYGEN SATURATION: 100 % | DIASTOLIC BLOOD PRESSURE: 85 MMHG

## 2021-11-23 DIAGNOSIS — E83.52 HYPERCALCEMIA: ICD-10-CM

## 2021-11-23 DIAGNOSIS — I47.1 MULTIFOCAL ATRIAL TACHYCARDIA (HCC): Primary | ICD-10-CM

## 2021-11-23 LAB
ALBUMIN SERPL-MCNC: 5.7 G/DL (ref 3.5–5.2)
ALBUMIN/GLOB SERPL: 1.9 G/DL
ALP SERPL-CCNC: 89 U/L (ref 39–117)
ALT SERPL W P-5'-P-CCNC: 46 U/L (ref 1–41)
AMPHET+METHAMPHET UR QL: NEGATIVE
ANION GAP SERPL CALCULATED.3IONS-SCNC: 15.5 MMOL/L (ref 5–15)
AST SERPL-CCNC: 45 U/L (ref 1–40)
BARBITURATES UR QL SCN: NEGATIVE
BASOPHILS # BLD AUTO: 0.07 10*3/MM3 (ref 0–0.2)
BASOPHILS NFR BLD AUTO: 0.8 % (ref 0–1.5)
BENZODIAZ UR QL SCN: NEGATIVE
BILIRUB SERPL-MCNC: 0.8 MG/DL (ref 0–1.2)
BUN SERPL-MCNC: 9 MG/DL (ref 6–20)
BUN/CREAT SERPL: 9.8 (ref 7–25)
CALCIUM SPEC-SCNC: 11.1 MG/DL (ref 8.6–10.5)
CANNABINOIDS SERPL QL: POSITIVE
CHLORIDE SERPL-SCNC: 100 MMOL/L (ref 98–107)
CO2 SERPL-SCNC: 22.5 MMOL/L (ref 22–29)
COCAINE UR QL: NEGATIVE
CREAT SERPL-MCNC: 0.92 MG/DL (ref 0.76–1.27)
D DIMER PPP FEU-MCNC: <0.17 MG/L (FEU) (ref 0–0.59)
DEPRECATED RDW RBC AUTO: 41.8 FL (ref 37–54)
EOSINOPHIL # BLD AUTO: 0.06 10*3/MM3 (ref 0–0.4)
EOSINOPHIL NFR BLD AUTO: 0.7 % (ref 0.3–6.2)
ERYTHROCYTE [DISTWIDTH] IN BLOOD BY AUTOMATED COUNT: 13 % (ref 12.3–15.4)
ETHANOL BLD-MCNC: <10 MG/DL (ref 0–10)
ETHANOL UR QL: <0.01 %
GFR SERPL CREATININE-BSD FRML MDRD: 94 ML/MIN/1.73
GLOBULIN UR ELPH-MCNC: 3 GM/DL
GLUCOSE SERPL-MCNC: 123 MG/DL (ref 65–99)
HCT VFR BLD AUTO: 47.3 % (ref 37.5–51)
HGB BLD-MCNC: 16.3 G/DL (ref 13–17.7)
HOLD SPECIMEN: NORMAL
HOLD SPECIMEN: NORMAL
IMM GRANULOCYTES # BLD AUTO: 0.03 10*3/MM3 (ref 0–0.05)
IMM GRANULOCYTES NFR BLD AUTO: 0.4 % (ref 0–0.5)
LYMPHOCYTES # BLD AUTO: 0.94 10*3/MM3 (ref 0.7–3.1)
LYMPHOCYTES NFR BLD AUTO: 11 % (ref 19.6–45.3)
MCH RBC QN AUTO: 30.5 PG (ref 26.6–33)
MCHC RBC AUTO-ENTMCNC: 34.5 G/DL (ref 31.5–35.7)
MCV RBC AUTO: 88.4 FL (ref 79–97)
METHADONE UR QL SCN: NEGATIVE
MONOCYTES # BLD AUTO: 0.36 10*3/MM3 (ref 0.1–0.9)
MONOCYTES NFR BLD AUTO: 4.2 % (ref 5–12)
NEUTROPHILS NFR BLD AUTO: 7.07 10*3/MM3 (ref 1.7–7)
NEUTROPHILS NFR BLD AUTO: 82.9 % (ref 42.7–76)
NRBC BLD AUTO-RTO: 0 /100 WBC (ref 0–0.2)
NT-PROBNP SERPL-MCNC: 32.1 PG/ML (ref 0–450)
OPIATES UR QL: NEGATIVE
OXYCODONE UR QL SCN: NEGATIVE
PLATELET # BLD AUTO: 346 10*3/MM3 (ref 140–450)
PMV BLD AUTO: 10 FL (ref 6–12)
POTASSIUM SERPL-SCNC: 3.9 MMOL/L (ref 3.5–5.2)
PROT SERPL-MCNC: 8.7 G/DL (ref 6–8.5)
QT INTERVAL: 319 MS
RBC # BLD AUTO: 5.35 10*6/MM3 (ref 4.14–5.8)
SODIUM SERPL-SCNC: 138 MMOL/L (ref 136–145)
TROPONIN T SERPL-MCNC: <0.01 NG/ML (ref 0–0.03)
WBC NRBC COR # BLD: 8.53 10*3/MM3 (ref 3.4–10.8)
WHOLE BLOOD HOLD SPECIMEN: NORMAL
WHOLE BLOOD HOLD SPECIMEN: NORMAL

## 2021-11-23 PROCEDURE — 93010 ELECTROCARDIOGRAM REPORT: CPT | Performed by: INTERNAL MEDICINE

## 2021-11-23 PROCEDURE — 80307 DRUG TEST PRSMV CHEM ANLYZR: CPT | Performed by: EMERGENCY MEDICINE

## 2021-11-23 PROCEDURE — 82077 ASSAY SPEC XCP UR&BREATH IA: CPT | Performed by: EMERGENCY MEDICINE

## 2021-11-23 PROCEDURE — 85025 COMPLETE CBC W/AUTO DIFF WBC: CPT | Performed by: EMERGENCY MEDICINE

## 2021-11-23 PROCEDURE — 71045 X-RAY EXAM CHEST 1 VIEW: CPT

## 2021-11-23 PROCEDURE — 84484 ASSAY OF TROPONIN QUANT: CPT | Performed by: EMERGENCY MEDICINE

## 2021-11-23 PROCEDURE — 83880 ASSAY OF NATRIURETIC PEPTIDE: CPT | Performed by: EMERGENCY MEDICINE

## 2021-11-23 PROCEDURE — 85379 FIBRIN DEGRADATION QUANT: CPT | Performed by: EMERGENCY MEDICINE

## 2021-11-23 PROCEDURE — 93005 ELECTROCARDIOGRAM TRACING: CPT | Performed by: EMERGENCY MEDICINE

## 2021-11-23 PROCEDURE — 80053 COMPREHEN METABOLIC PANEL: CPT | Performed by: EMERGENCY MEDICINE

## 2021-11-23 PROCEDURE — 99284 EMERGENCY DEPT VISIT MOD MDM: CPT

## 2021-11-23 RX ORDER — SODIUM CHLORIDE 0.9 % (FLUSH) 0.9 %
10 SYRINGE (ML) INJECTION AS NEEDED
Status: DISCONTINUED | OUTPATIENT
Start: 2021-11-23 | End: 2021-11-23 | Stop reason: HOSPADM

## 2021-11-23 RX ADMIN — SODIUM CHLORIDE 1000 ML: 9 INJECTION, SOLUTION INTRAVENOUS at 09:09

## 2021-11-23 RX ADMIN — SODIUM CHLORIDE 1000 ML: 9 INJECTION, SOLUTION INTRAVENOUS at 06:48

## 2022-04-09 ENCOUNTER — HOSPITAL ENCOUNTER (EMERGENCY)
Facility: HOSPITAL | Age: 37
Discharge: HOME | End: 2022-04-10
Attending: EMERGENCY MEDICINE | Admitting: EMERGENCY MEDICINE

## 2022-04-09 ENCOUNTER — APPOINTMENT (OUTPATIENT)
Dept: GENERAL RADIOLOGY | Facility: HOSPITAL | Age: 37
End: 2022-04-09

## 2022-04-09 DIAGNOSIS — K50.111 CROHN'S DISEASE OF COLON WITH RECTAL BLEEDING: ICD-10-CM

## 2022-04-09 DIAGNOSIS — K51.00 PANCOLITIS: Primary | ICD-10-CM

## 2022-04-09 LAB
ALBUMIN SERPL-MCNC: 4.8 G/DL (ref 3.5–5.2)
ALBUMIN/GLOB SERPL: 1.8 G/DL
ALP SERPL-CCNC: 74 U/L (ref 39–117)
ALT SERPL W P-5'-P-CCNC: 18 U/L (ref 1–41)
ANION GAP SERPL CALCULATED.3IONS-SCNC: 14.5 MMOL/L (ref 5–15)
AST SERPL-CCNC: 26 U/L (ref 1–40)
BASOPHILS # BLD AUTO: 0.08 10*3/MM3 (ref 0–0.2)
BASOPHILS NFR BLD AUTO: 0.8 % (ref 0–1.5)
BILIRUB SERPL-MCNC: 0.9 MG/DL (ref 0–1.2)
BUN SERPL-MCNC: 6 MG/DL (ref 6–20)
BUN/CREAT SERPL: 6.2 (ref 7–25)
CALCIUM SPEC-SCNC: 9.8 MG/DL (ref 8.6–10.5)
CHLORIDE SERPL-SCNC: 104 MMOL/L (ref 98–107)
CO2 SERPL-SCNC: 22.5 MMOL/L (ref 22–29)
CREAT SERPL-MCNC: 0.97 MG/DL (ref 0.76–1.27)
DEPRECATED RDW RBC AUTO: 40.9 FL (ref 37–54)
EGFRCR SERPLBLD CKD-EPI 2021: 105.1 ML/MIN/1.73
EOSINOPHIL # BLD AUTO: 0.24 10*3/MM3 (ref 0–0.4)
EOSINOPHIL NFR BLD AUTO: 2.5 % (ref 0.3–6.2)
ERYTHROCYTE [DISTWIDTH] IN BLOOD BY AUTOMATED COUNT: 12.7 % (ref 12.3–15.4)
GLOBULIN UR ELPH-MCNC: 2.6 GM/DL
GLUCOSE SERPL-MCNC: 101 MG/DL (ref 65–99)
HCT VFR BLD AUTO: 40.6 % (ref 37.5–51)
HGB BLD-MCNC: 13.8 G/DL (ref 13–17.7)
HOLD SPECIMEN: NORMAL
HOLD SPECIMEN: NORMAL
IMM GRANULOCYTES # BLD AUTO: 0.02 10*3/MM3 (ref 0–0.05)
IMM GRANULOCYTES NFR BLD AUTO: 0.2 % (ref 0–0.5)
LIPASE SERPL-CCNC: 32 U/L (ref 13–60)
LYMPHOCYTES # BLD AUTO: 1.49 10*3/MM3 (ref 0.7–3.1)
LYMPHOCYTES NFR BLD AUTO: 15.7 % (ref 19.6–45.3)
MCH RBC QN AUTO: 30.1 PG (ref 26.6–33)
MCHC RBC AUTO-ENTMCNC: 34 G/DL (ref 31.5–35.7)
MCV RBC AUTO: 88.6 FL (ref 79–97)
MONOCYTES # BLD AUTO: 0.89 10*3/MM3 (ref 0.1–0.9)
MONOCYTES NFR BLD AUTO: 9.4 % (ref 5–12)
NEUTROPHILS NFR BLD AUTO: 6.79 10*3/MM3 (ref 1.7–7)
NEUTROPHILS NFR BLD AUTO: 71.4 % (ref 42.7–76)
NRBC BLD AUTO-RTO: 0 /100 WBC (ref 0–0.2)
PLATELET # BLD AUTO: 282 10*3/MM3 (ref 140–450)
PMV BLD AUTO: 9.7 FL (ref 6–12)
POTASSIUM SERPL-SCNC: 3.5 MMOL/L (ref 3.5–5.2)
PROT SERPL-MCNC: 7.4 G/DL (ref 6–8.5)
RBC # BLD AUTO: 4.58 10*6/MM3 (ref 4.14–5.8)
SODIUM SERPL-SCNC: 141 MMOL/L (ref 136–145)
TROPONIN T SERPL-MCNC: <0.01 NG/ML (ref 0–0.03)
WBC NRBC COR # BLD: 9.51 10*3/MM3 (ref 3.4–10.8)
WHOLE BLOOD HOLD SPECIMEN: NORMAL
WHOLE BLOOD HOLD SPECIMEN: NORMAL

## 2022-04-09 PROCEDURE — 80053 COMPREHEN METABOLIC PANEL: CPT

## 2022-04-09 PROCEDURE — 93005 ELECTROCARDIOGRAM TRACING: CPT | Performed by: EMERGENCY MEDICINE

## 2022-04-09 PROCEDURE — 99284 EMERGENCY DEPT VISIT MOD MDM: CPT

## 2022-04-09 PROCEDURE — 93005 ELECTROCARDIOGRAM TRACING: CPT

## 2022-04-09 PROCEDURE — 83690 ASSAY OF LIPASE: CPT

## 2022-04-09 PROCEDURE — 96374 THER/PROPH/DIAG INJ IV PUSH: CPT

## 2022-04-09 PROCEDURE — 84484 ASSAY OF TROPONIN QUANT: CPT

## 2022-04-09 PROCEDURE — 85025 COMPLETE CBC W/AUTO DIFF WBC: CPT

## 2022-04-09 PROCEDURE — 71045 X-RAY EXAM CHEST 1 VIEW: CPT

## 2022-04-09 PROCEDURE — 25010000002 METHYLPREDNISOLONE PER 125 MG: Performed by: NURSE PRACTITIONER

## 2022-04-09 PROCEDURE — 93010 ELECTROCARDIOGRAM REPORT: CPT | Performed by: INTERNAL MEDICINE

## 2022-04-09 RX ORDER — SODIUM CHLORIDE 0.9 % (FLUSH) 0.9 %
10 SYRINGE (ML) INJECTION AS NEEDED
Status: DISCONTINUED | OUTPATIENT
Start: 2022-04-09 | End: 2022-04-10 | Stop reason: HOSPADM

## 2022-04-09 RX ORDER — ATENOLOL 25 MG/1
25 TABLET ORAL DAILY
COMMUNITY
End: 2022-11-15 | Stop reason: HOSPADM

## 2022-04-09 RX ORDER — DICYCLOMINE HYDROCHLORIDE 10 MG/1
20 CAPSULE ORAL ONCE
Status: COMPLETED | OUTPATIENT
Start: 2022-04-09 | End: 2022-04-09

## 2022-04-09 RX ORDER — METHYLPREDNISOLONE SODIUM SUCCINATE 125 MG/2ML
125 INJECTION, POWDER, LYOPHILIZED, FOR SOLUTION INTRAMUSCULAR; INTRAVENOUS ONCE
Status: COMPLETED | OUTPATIENT
Start: 2022-04-09 | End: 2022-04-09

## 2022-04-09 RX ADMIN — DICYCLOMINE HYDROCHLORIDE 20 MG: 10 CAPSULE ORAL at 23:47

## 2022-04-09 RX ADMIN — METHYLPREDNISOLONE SODIUM SUCCINATE 125 MG: 125 INJECTION, POWDER, FOR SOLUTION INTRAMUSCULAR; INTRAVENOUS at 23:47

## 2022-04-10 ENCOUNTER — APPOINTMENT (OUTPATIENT)
Dept: CT IMAGING | Facility: HOSPITAL | Age: 37
End: 2022-04-10

## 2022-04-10 VITALS
OXYGEN SATURATION: 96 % | WEIGHT: 122.58 LBS | RESPIRATION RATE: 19 BRPM | SYSTOLIC BLOOD PRESSURE: 134 MMHG | HEART RATE: 64 BPM | DIASTOLIC BLOOD PRESSURE: 90 MMHG | BODY MASS INDEX: 18.16 KG/M2 | HEIGHT: 69 IN | TEMPERATURE: 98.6 F

## 2022-04-10 LAB — QT INTERVAL: 370 MS

## 2022-04-10 PROCEDURE — 74177 CT ABD & PELVIS W/CONTRAST: CPT

## 2022-04-10 PROCEDURE — 25010000002 KETOROLAC TROMETHAMINE PER 15 MG: Performed by: NURSE PRACTITIONER

## 2022-04-10 PROCEDURE — 0 IOPAMIDOL PER 1 ML: Performed by: EMERGENCY MEDICINE

## 2022-04-10 PROCEDURE — 96375 TX/PRO/DX INJ NEW DRUG ADDON: CPT

## 2022-04-10 RX ORDER — DICYCLOMINE HCL 20 MG
20 TABLET ORAL EVERY 6 HOURS PRN
Qty: 30 TABLET | Refills: 0 | Status: SHIPPED | OUTPATIENT
Start: 2022-04-10 | End: 2022-11-12

## 2022-04-10 RX ORDER — HYDROCODONE BITARTRATE AND ACETAMINOPHEN 5; 325 MG/1; MG/1
1 TABLET ORAL 4 TIMES DAILY PRN
Qty: 12 TABLET | Refills: 0 | Status: SHIPPED | OUTPATIENT
Start: 2022-04-10 | End: 2022-11-12

## 2022-04-10 RX ORDER — ONDANSETRON 4 MG/1
4 TABLET, ORALLY DISINTEGRATING ORAL 4 TIMES DAILY PRN
Qty: 12 TABLET | Refills: 0 | Status: SHIPPED | OUTPATIENT
Start: 2022-04-10 | End: 2022-11-12

## 2022-04-10 RX ORDER — PREDNISONE 20 MG/1
40 TABLET ORAL DAILY
Qty: 14 TABLET | Refills: 0 | Status: SHIPPED | OUTPATIENT
Start: 2022-04-10 | End: 2022-04-17

## 2022-04-10 RX ORDER — KETOROLAC TROMETHAMINE 30 MG/ML
30 INJECTION, SOLUTION INTRAMUSCULAR; INTRAVENOUS ONCE
Status: COMPLETED | OUTPATIENT
Start: 2022-04-10 | End: 2022-04-10

## 2022-04-10 RX ADMIN — IOPAMIDOL 100 ML: 755 INJECTION, SOLUTION INTRAVENOUS at 00:36

## 2022-04-10 RX ADMIN — KETOROLAC TROMETHAMINE 30 MG: 30 INJECTION, SOLUTION INTRAMUSCULAR; INTRAVENOUS at 02:12

## 2022-04-10 NOTE — DISCHARGE INSTRUCTIONS
Follow-up with gastroenterologist for further treatment of Crohn's and recent colitis flare.    Take medications as prescribed.    Return for new or worsening symptoms.    Drink plenty of fluids

## 2022-04-10 NOTE — ED PROVIDER NOTES
Time: 04:35 EDT  Arrived by: EMS  Chief Complaint: Diarrhea and blood in stool  History provided by: Patient   History is limited by: N/A    History of Present Illness:  Patient is a 36 y.o. year old male that presents to the emergency department with ab pain and diarrhea for several weeks      Vomiting  The primary symptoms include abdominal pain, diarrhea and hematochezia. Primary symptoms do not include fever, weight loss, nausea, vomiting, melena, hematemesis, jaundice, dysuria, myalgias, arthralgias or rash. The illness began more than 7 days ago (Patient states several weeks unsure exactly how long). The problem has not changed since onset.  The illness is also significant for tenesmus. The illness does not include chills or back pain. Significant associated medical issues include inflammatory bowel disease. Associated medical issues comments: chronic Crohn's.   Diarrhea  The primary symptoms include abdominal pain, diarrhea and hematochezia. Primary symptoms do not include fever, weight loss, nausea, vomiting, melena, hematemesis, jaundice, dysuria, myalgias, arthralgias or rash.   The illness is also significant for tenesmus. The illness does not include chills or back pain. Significant associated medical issues include inflammatory bowel disease. Associated medical issues comments: chronic Crohn's.   Shortness of Breath  Associated symptoms: abdominal pain    Associated symptoms: no chest pain, no cough, no ear pain, no fever, no headaches, no rash, no sore throat and no vomiting        Similar Symptoms Previously: hx crohns    Recently seen: Yes but patient cannot remember when      Patient Care Team  Primary Care Provider: alysa kang  She has seen Dr. Dukes for GI symptoms but not for several years     Past Medical History:     Allergies   Allergen Reactions   • Haloperidol Other (See Comments)     Tardive dyskenesthia  Foaming at the mouth   • Risperidone Unknown - High Severity   • Trazodone Other  (See Comments)     Stuffy nose     Past Medical History:   Diagnosis Date   • Abnormal ECG    • Anxiety    • Arrhythmia    • Asthma    • Hypertension    • POTS (postural orthostatic tachycardia syndrome)    • Tachycardia      History reviewed. No pertinent surgical history.  Family History   Problem Relation Age of Onset   • Thyroid disease Mother    • Asthma Mother    • Hyperlipidemia Mother    • Diabetes Father    • Hypertension Father    • Heart disease Father        Home Medications:  Prior to Admission medications    Medication Sig Start Date End Date Taking? Authorizing Provider   albuterol (PROVENTIL HFA;VENTOLIN HFA) 108 (90 Base) MCG/ACT inhaler Inhale 2 puffs Every 4 (Four) Hours As Needed for Wheezing.    Cici Montenegro MD   Ascorbic Acid (BG-C PO) Take  by mouth Daily.    Cici Montenegro MD   atenolol (TENORMIN) 25 MG tablet Take 25 mg by mouth Daily.    Cici Montenegro MD   B Complex Vitamins (vitamin b complex) capsule capsule Take  by mouth Daily.    Cici Montenegro MD   Magnesium Chloride (MAGNESIUM DR PO) Take  by mouth Daily.    Cici Montenegro MD   metoprolol succinate XL (TOPROL-XL) 25 MG 24 hr tablet Take 1 tablet by mouth Daily. 11/17/21   Rudolph Willis MD   VITAMIN D PO Take  by mouth Daily.    Cici Montenegro MD        Social History:   PT  reports that he has never smoked. His smokeless tobacco use includes snuff. He reports current alcohol use. He reports previous drug use. Drug: Marijuana.    Record Review:  I have reviewed the patient's records in [facility].     Review of Systems  Review of Systems   Constitutional: Negative for chills, fever and weight loss.   HENT: Negative for congestion, ear pain and sore throat.    Eyes: Negative for pain.   Respiratory: Positive for shortness of breath ( Prior to arrival but resolved). Negative for cough and chest tightness.    Cardiovascular: Negative for chest pain.   Gastrointestinal: Positive for abdominal pain,  "blood in stool, diarrhea and hematochezia. Negative for hematemesis, jaundice, melena, nausea and vomiting.   Genitourinary: Negative for dysuria, flank pain and hematuria.   Musculoskeletal: Negative for arthralgias, back pain, joint swelling and myalgias.   Skin: Negative for pallor and rash.   Neurological: Negative for seizures and headaches.   Hematological: Negative.    Psychiatric/Behavioral: Negative.    All other systems reviewed and are negative.       Physical Exam  /90   Pulse 64   Temp 98.6 °F (37 °C) (Oral)   Resp 19   Ht 175.3 cm (69\")   Wt 55.6 kg (122 lb 9.2 oz)   SpO2 96%   BMI 18.10 kg/m²     Physical Exam  Vitals and nursing note reviewed.   Constitutional:       General: He is not in acute distress.     Appearance: Normal appearance. He is not toxic-appearing.   HENT:      Head: Atraumatic.      Mouth/Throat:      Mouth: Mucous membranes are moist.   Eyes:      General: No scleral icterus.  Cardiovascular:      Rate and Rhythm: Normal rate and regular rhythm.      Pulses: Normal pulses.      Heart sounds: Normal heart sounds.   Pulmonary:      Effort: Pulmonary effort is normal. No respiratory distress.      Breath sounds: Normal breath sounds.   Abdominal:      General: Abdomen is flat. Bowel sounds are normal.      Palpations: Abdomen is soft.      Tenderness: There is abdominal tenderness ( Diffuse generalized mild).   Musculoskeletal:         General: Normal range of motion.      Cervical back: Normal range of motion.   Skin:     General: Skin is warm and dry.   Neurological:      Mental Status: He is alert and oriented to person, place, and time. Mental status is at baseline.   Psychiatric:         Behavior: Behavior is agitated.          ED Course  /90   Pulse 64   Temp 98.6 °F (37 °C) (Oral)   Resp 19   Ht 175.3 cm (69\")   Wt 55.6 kg (122 lb 9.2 oz)   SpO2 96%   BMI 18.10 kg/m²   Results for orders placed or performed during the hospital encounter of 04/09/22 "   Comprehensive Metabolic Panel    Specimen: Blood   Result Value Ref Range    Glucose 101 (H) 65 - 99 mg/dL    BUN 6 6 - 20 mg/dL    Creatinine 0.97 0.76 - 1.27 mg/dL    Sodium 141 136 - 145 mmol/L    Potassium 3.5 3.5 - 5.2 mmol/L    Chloride 104 98 - 107 mmol/L    CO2 22.5 22.0 - 29.0 mmol/L    Calcium 9.8 8.6 - 10.5 mg/dL    Total Protein 7.4 6.0 - 8.5 g/dL    Albumin 4.80 3.50 - 5.20 g/dL    ALT (SGPT) 18 1 - 41 U/L    AST (SGOT) 26 1 - 40 U/L    Alkaline Phosphatase 74 39 - 117 U/L    Total Bilirubin 0.9 0.0 - 1.2 mg/dL    Globulin 2.6 gm/dL    A/G Ratio 1.8 g/dL    BUN/Creatinine Ratio 6.2 (L) 7.0 - 25.0    Anion Gap 14.5 5.0 - 15.0 mmol/L    eGFR 105.1 >60.0 mL/min/1.73   Lipase    Specimen: Blood   Result Value Ref Range    Lipase 32 13 - 60 U/L   Troponin    Specimen: Blood   Result Value Ref Range    Troponin T <0.010 0.000 - 0.030 ng/mL   CBC Auto Differential    Specimen: Blood   Result Value Ref Range    WBC 9.51 3.40 - 10.80 10*3/mm3    RBC 4.58 4.14 - 5.80 10*6/mm3    Hemoglobin 13.8 13.0 - 17.7 g/dL    Hematocrit 40.6 37.5 - 51.0 %    MCV 88.6 79.0 - 97.0 fL    MCH 30.1 26.6 - 33.0 pg    MCHC 34.0 31.5 - 35.7 g/dL    RDW 12.7 12.3 - 15.4 %    RDW-SD 40.9 37.0 - 54.0 fl    MPV 9.7 6.0 - 12.0 fL    Platelets 282 140 - 450 10*3/mm3    Neutrophil % 71.4 42.7 - 76.0 %    Lymphocyte % 15.7 (L) 19.6 - 45.3 %    Monocyte % 9.4 5.0 - 12.0 %    Eosinophil % 2.5 0.3 - 6.2 %    Basophil % 0.8 0.0 - 1.5 %    Immature Grans % 0.2 0.0 - 0.5 %    Neutrophils, Absolute 6.79 1.70 - 7.00 10*3/mm3    Lymphocytes, Absolute 1.49 0.70 - 3.10 10*3/mm3    Monocytes, Absolute 0.89 0.10 - 0.90 10*3/mm3    Eosinophils, Absolute 0.24 0.00 - 0.40 10*3/mm3    Basophils, Absolute 0.08 0.00 - 0.20 10*3/mm3    Immature Grans, Absolute 0.02 0.00 - 0.05 10*3/mm3    nRBC 0.0 0.0 - 0.2 /100 WBC   ECG 12 Lead   Result Value Ref Range    QT Interval 370 ms   Green Top (Gel)   Result Value Ref Range    Extra Tube Hold Specimen    Lavender  Top   Result Value Ref Range    Extra Tube Hold Specimen    Gold Top - SST   Result Value Ref Range    Extra Tube Hold Specimen    Light Blue Top   Result Value Ref Range    Extra Tube Hold Specimen      Medications   dicyclomine (BENTYL) capsule 20 mg (20 mg Oral Given 4/9/22 2347)   methylPREDNISolone sodium succinate (SOLU-Medrol) injection 125 mg (125 mg Intravenous Given 4/9/22 2347)   iopamidol (ISOVUE-370) 76 % injection 100 mL (100 mL Intravenous Given 4/10/22 0036)   ketorolac (TORADOL) injection 30 mg (30 mg Intravenous Given 4/10/22 0212)     CT Abdomen Pelvis With Contrast    Result Date: 4/10/2022  Narrative: PROCEDURE: CT ABDOMEN PELVIS W CONTRAST  COMPARISON: 12/21/2020.  INDICATIONS: UNSPECIFIED ABDOMINAL PAIN AND BLOOD IN STOOL TODAY.  TECHNIQUE: After obtaining the patient's consent, 507 CT images were created with non-ionic intravenous contrast material.  No oral contrast agent was administered for the study.  PROTOCOL:   Standard CT imaging protocol performed.    RADIATION:   DLP: 269 mGy*cm.   Automated exposure control was utilized to minimize radiation dose.  FINDINGS: Noncalcified bibasilar pulmonary nodules are seen, which measure about 5 mm or less.  Consider imaging follow-up of the findings to ensure a benign progression if clinically warranted.  No acute infiltrate is seen.  No pleural effusion.  There is diffuse hepatic steatosis.  No hepatomegaly.  No splenomegaly.  There are stable hepatic cysts, which measure 9 mm or less.  No renal/ureteral stones or hydronephrosis or obstructive uropathy.  No acute pyelonephritis.  No urinary bladder calculi are seen.  There is prominent enhancement of the colonic wall with minimal if any mural thickening.  Air-fluid levels are seen throughout the colon.  There may be a mild infectious/inflammatory pancolitis with an associated generalized adynamic ileus.  No mechanical bowel obstruction is suspected.  No pericolonic fluid collections are seen to  suggest abscess.  No obstructing appendicoliths.  No definite acute appendicitis.  No pneumoperitoneum or pneumatosis.  No portal or mesenteric venous gas.  No acute intraperitoneal or retroperitoneal hemorrhage.  No acute fracture or aggressive osseous lesion.  There may be mild subsegmental atelectasis in the lung bases.  No gallstones.  No acute cholecystitis.  No acute pancreatitis.  No adrenal mass.  No other acute findings are appreciated.      Impression:  Mild nonspecific possibly acute-to-subacute infectious/inflammatory pan-colitis is suspected without a mechanical bowel obstruction.  No pneumoperitoneum or pneumatosis.  No portal or mesenteric venous gas is seen.  No pericolonic fluid collections are seen to suggest abscess.  There may be a generalized adynamic ileus.  Consider close interval clinical and imaging follow-up of the findings to ensure a benign progression.  No other acute findings are suspected.    CASEY VANEGAS JR, MD       Electronically Signed and Approved By: CASEY VANEGAS JR, MD on 4/10/2022 at 1:02             XR Chest 1 View    Result Date: 4/9/2022  Narrative: PROCEDURE: XR CHEST 1 VW  COMPARISON: 11/23/2021.  INDICATIONS: TACHYCARDIA; SHORTNESS OF BREATH SINCE LAST NIGHT.  FINDINGS:  A single AP upright portable chest radiograph was performed.  No cardiac enlargement is seen.  No acute infiltrate is appreciated.  No pleural effusion or pneumothorax is identified.  External artifacts obscure detail.  Degenerative changes involve the right glenohumeral joint, seen previously.  To a lesser degree, there may be degenerative changes of left shoulder.  No significant interval change is seen since the prior study.      Impression:   No acute infiltrate is appreciated.      CSAEY VANEGAS JR, MD       Electronically Signed and Approved By: CASEY VANEGAS JR, MD on 4/09/2022 at 21:21             Medical Decision Making:                     MDM  Number of Diagnoses or Management  Options  Crohn's disease of colon with rectal bleeding (HCC)  Pancolitis (HCC)  Diagnosis management comments: The patient is resting comfortably and feels better, is alert and in no distress. Repeat examination is unremarkable and benign; in particular, there's no discomfort at McBurney's point and there is no pulsatile mass. The history, exam, diagnostic testing, and current condition does not suggest acute appendicitis, bowel obstruction, acute cholecystitis, bowel perforation, major gastrointestinal bleeding, severe diverticulitis, abdominal aortic aneurysm, mesenteric ischemia, volvulus, sepsis, or other significant pathology that warrants further testing, continued ED treatment, admission, for surgical evaluation at this point. The vital signs have been stable. The patient does not have uncontrollable pain, intractable vomiting, or other significant symptoms. The patient's condition is stable and appropriate for discharge from the emergency department.         Amount and/or Complexity of Data Reviewed  Clinical lab tests: reviewed and ordered  Tests in the radiology section of CPT®: reviewed and ordered  Tests in the medicine section of CPT®: reviewed and ordered    Risk of Complications, Morbidity, and/or Mortality  Presenting problems: moderate  Diagnostic procedures: moderate  Management options: low    Patient Progress  Patient progress: stable       Final diagnoses:   Pancolitis (HCC)   Crohn's disease of colon with rectal bleeding (HCC)        Disposition:  ED Disposition     ED Disposition   Discharge    Condition   Stable    Comment   --              Jacqui Daily, APRN  04/10/22 0435

## 2022-04-10 NOTE — ED NOTES
PT REFUSED TO GIVE STOOL SAMPLE OR URINE SAMPLE. PT ALSO REFUSED THIS RN TAKING AN OCCULT STOOL SAMPLE.

## 2022-11-11 PROCEDURE — 36415 COLL VENOUS BLD VENIPUNCTURE: CPT

## 2022-11-11 PROCEDURE — 99285 EMERGENCY DEPT VISIT HI MDM: CPT

## 2022-11-12 ENCOUNTER — APPOINTMENT (OUTPATIENT)
Dept: GENERAL RADIOLOGY | Facility: HOSPITAL | Age: 37
End: 2022-11-12

## 2022-11-12 ENCOUNTER — APPOINTMENT (OUTPATIENT)
Dept: CT IMAGING | Facility: HOSPITAL | Age: 37
End: 2022-11-12

## 2022-11-12 ENCOUNTER — HOSPITAL ENCOUNTER (INPATIENT)
Facility: HOSPITAL | Age: 37
LOS: 3 days | Discharge: HOME | End: 2022-11-15
Attending: EMERGENCY MEDICINE

## 2022-11-12 DIAGNOSIS — Z87.19 HISTORY OF CROHN'S DISEASE: ICD-10-CM

## 2022-11-12 DIAGNOSIS — K56.609 SMALL BOWEL OBSTRUCTION: Primary | ICD-10-CM

## 2022-11-12 LAB
ALBUMIN SERPL-MCNC: 4.9 G/DL (ref 3.5–5.2)
ALBUMIN/GLOB SERPL: 1.8 G/DL
ALP SERPL-CCNC: 81 U/L (ref 39–117)
ALT SERPL W P-5'-P-CCNC: 12 U/L (ref 1–41)
ANION GAP SERPL CALCULATED.3IONS-SCNC: 14.3 MMOL/L (ref 5–15)
AST SERPL-CCNC: 23 U/L (ref 1–40)
BASOPHILS # BLD AUTO: 0.08 10*3/MM3 (ref 0–0.2)
BASOPHILS NFR BLD AUTO: 0.6 % (ref 0–1.5)
BILIRUB SERPL-MCNC: 0.5 MG/DL (ref 0–1.2)
BUN SERPL-MCNC: 17 MG/DL (ref 6–20)
BUN/CREAT SERPL: 16.7 (ref 7–25)
CALCIUM SPEC-SCNC: 10.2 MG/DL (ref 8.6–10.5)
CHLORIDE SERPL-SCNC: 100 MMOL/L (ref 98–107)
CO2 SERPL-SCNC: 24.7 MMOL/L (ref 22–29)
CREAT SERPL-MCNC: 1.02 MG/DL (ref 0.76–1.27)
CRP SERPL-MCNC: 0.94 MG/DL (ref 0–0.5)
DEPRECATED RDW RBC AUTO: 40.5 FL (ref 37–54)
EGFRCR SERPLBLD CKD-EPI 2021: 98.3 ML/MIN/1.73
EOSINOPHIL # BLD AUTO: 0.35 10*3/MM3 (ref 0–0.4)
EOSINOPHIL NFR BLD AUTO: 2.4 % (ref 0.3–6.2)
ERYTHROCYTE [DISTWIDTH] IN BLOOD BY AUTOMATED COUNT: 12.7 % (ref 12.3–15.4)
FLUAV AG NPH QL: NEGATIVE
FLUBV AG NPH QL IA: NEGATIVE
GLOBULIN UR ELPH-MCNC: 2.7 GM/DL
GLUCOSE SERPL-MCNC: 90 MG/DL (ref 65–99)
HCT VFR BLD AUTO: 42.4 % (ref 37.5–51)
HGB BLD-MCNC: 14.2 G/DL (ref 13–17.7)
HOLD SPECIMEN: NORMAL
HOLD SPECIMEN: NORMAL
IMM GRANULOCYTES # BLD AUTO: 0.04 10*3/MM3 (ref 0–0.05)
IMM GRANULOCYTES NFR BLD AUTO: 0.3 % (ref 0–0.5)
LIPASE SERPL-CCNC: 23 U/L (ref 13–60)
LYMPHOCYTES # BLD AUTO: 0.9 10*3/MM3 (ref 0.7–3.1)
LYMPHOCYTES NFR BLD AUTO: 6.3 % (ref 19.6–45.3)
MCH RBC QN AUTO: 29.4 PG (ref 26.6–33)
MCHC RBC AUTO-ENTMCNC: 33.5 G/DL (ref 31.5–35.7)
MCV RBC AUTO: 87.8 FL (ref 79–97)
MONOCYTES # BLD AUTO: 0.84 10*3/MM3 (ref 0.1–0.9)
MONOCYTES NFR BLD AUTO: 5.8 % (ref 5–12)
NEUTROPHILS NFR BLD AUTO: 12.15 10*3/MM3 (ref 1.7–7)
NEUTROPHILS NFR BLD AUTO: 84.6 % (ref 42.7–76)
NRBC BLD AUTO-RTO: 0 /100 WBC (ref 0–0.2)
PLATELET # BLD AUTO: 327 10*3/MM3 (ref 140–450)
PMV BLD AUTO: 10.5 FL (ref 6–12)
POTASSIUM SERPL-SCNC: 4.1 MMOL/L (ref 3.5–5.2)
PROT SERPL-MCNC: 7.6 G/DL (ref 6–8.5)
QT INTERVAL: 340 MS
RBC # BLD AUTO: 4.83 10*6/MM3 (ref 4.14–5.8)
SARS-COV-2 RNA PNL SPEC NAA+PROBE: NOT DETECTED
SODIUM SERPL-SCNC: 139 MMOL/L (ref 136–145)
WBC NRBC COR # BLD: 14.36 10*3/MM3 (ref 3.4–10.8)
WHOLE BLOOD HOLD COAG: NORMAL
WHOLE BLOOD HOLD SPECIMEN: NORMAL

## 2022-11-12 PROCEDURE — 83690 ASSAY OF LIPASE: CPT

## 2022-11-12 PROCEDURE — 25010000002 DICYCLOMINE PER 20 MG: Performed by: EMERGENCY MEDICINE

## 2022-11-12 PROCEDURE — 94799 UNLISTED PULMONARY SVC/PX: CPT

## 2022-11-12 PROCEDURE — 86140 C-REACTIVE PROTEIN: CPT | Performed by: INTERNAL MEDICINE

## 2022-11-12 PROCEDURE — 25010000002 HYDROMORPHONE 1 MG/ML SOLUTION: Performed by: INTERNAL MEDICINE

## 2022-11-12 PROCEDURE — 80053 COMPREHEN METABOLIC PANEL: CPT

## 2022-11-12 PROCEDURE — 99222 1ST HOSP IP/OBS MODERATE 55: CPT | Performed by: INTERNAL MEDICINE

## 2022-11-12 PROCEDURE — 0 IOPAMIDOL PER 1 ML: Performed by: EMERGENCY MEDICINE

## 2022-11-12 PROCEDURE — 85025 COMPLETE CBC W/AUTO DIFF WBC: CPT

## 2022-11-12 PROCEDURE — 93005 ELECTROCARDIOGRAM TRACING: CPT | Performed by: INTERNAL MEDICINE

## 2022-11-12 PROCEDURE — 25010000002 METHYLPREDNISOLONE PER 40 MG: Performed by: INTERNAL MEDICINE

## 2022-11-12 PROCEDURE — 74018 RADEX ABDOMEN 1 VIEW: CPT

## 2022-11-12 PROCEDURE — 25010000002 PIPERACILLIN SOD-TAZOBACTAM PER 1 G: Performed by: INTERNAL MEDICINE

## 2022-11-12 PROCEDURE — 36415 COLL VENOUS BLD VENIPUNCTURE: CPT

## 2022-11-12 PROCEDURE — 74177 CT ABD & PELVIS W/CONTRAST: CPT

## 2022-11-12 PROCEDURE — 99223 1ST HOSP IP/OBS HIGH 75: CPT | Performed by: SURGERY

## 2022-11-12 PROCEDURE — 25010000002 ONDANSETRON PER 1 MG: Performed by: EMERGENCY MEDICINE

## 2022-11-12 PROCEDURE — U0004 COV-19 TEST NON-CDC HGH THRU: HCPCS | Performed by: EMERGENCY MEDICINE

## 2022-11-12 PROCEDURE — 87804 INFLUENZA ASSAY W/OPTIC: CPT | Performed by: EMERGENCY MEDICINE

## 2022-11-12 RX ORDER — SODIUM CHLORIDE 0.9 % (FLUSH) 0.9 %
10 SYRINGE (ML) INJECTION AS NEEDED
Status: DISCONTINUED | OUTPATIENT
Start: 2022-11-12 | End: 2022-11-15 | Stop reason: HOSPADM

## 2022-11-12 RX ORDER — ACETAMINOPHEN 325 MG/1
650 TABLET ORAL EVERY 4 HOURS PRN
Status: DISCONTINUED | OUTPATIENT
Start: 2022-11-12 | End: 2022-11-13

## 2022-11-12 RX ORDER — ALBUTEROL SULFATE 2.5 MG/3ML
2.5 SOLUTION RESPIRATORY (INHALATION) EVERY 6 HOURS PRN
Status: DISCONTINUED | OUTPATIENT
Start: 2022-11-12 | End: 2022-11-15 | Stop reason: HOSPADM

## 2022-11-12 RX ORDER — SODIUM CHLORIDE 0.9 % (FLUSH) 0.9 %
10 SYRINGE (ML) INJECTION EVERY 12 HOURS SCHEDULED
Status: DISCONTINUED | OUTPATIENT
Start: 2022-11-12 | End: 2022-11-15 | Stop reason: HOSPADM

## 2022-11-12 RX ORDER — METHYLPREDNISOLONE SODIUM SUCCINATE 40 MG/ML
20 INJECTION, POWDER, LYOPHILIZED, FOR SOLUTION INTRAMUSCULAR; INTRAVENOUS EVERY 8 HOURS
Status: DISCONTINUED | OUTPATIENT
Start: 2022-11-12 | End: 2022-11-15 | Stop reason: HOSPADM

## 2022-11-12 RX ORDER — NITROGLYCERIN 0.4 MG/1
0.4 TABLET SUBLINGUAL
Status: DISCONTINUED | OUTPATIENT
Start: 2022-11-12 | End: 2022-11-15 | Stop reason: HOSPADM

## 2022-11-12 RX ORDER — DICYCLOMINE HYDROCHLORIDE 10 MG/ML
20 INJECTION INTRAMUSCULAR ONCE
Status: COMPLETED | OUTPATIENT
Start: 2022-11-12 | End: 2022-11-12

## 2022-11-12 RX ORDER — SODIUM CHLORIDE 9 MG/ML
40 INJECTION, SOLUTION INTRAVENOUS AS NEEDED
Status: DISCONTINUED | OUTPATIENT
Start: 2022-11-12 | End: 2022-11-15 | Stop reason: HOSPADM

## 2022-11-12 RX ORDER — DEXTROSE AND SODIUM CHLORIDE 5; .45 G/100ML; G/100ML
100 INJECTION, SOLUTION INTRAVENOUS CONTINUOUS
Status: ACTIVE | OUTPATIENT
Start: 2022-11-12 | End: 2022-11-15

## 2022-11-12 RX ORDER — ONDANSETRON 2 MG/ML
4 INJECTION INTRAMUSCULAR; INTRAVENOUS ONCE
Status: COMPLETED | OUTPATIENT
Start: 2022-11-12 | End: 2022-11-12

## 2022-11-12 RX ADMIN — DEXTROSE AND SODIUM CHLORIDE 100 ML/HR: 5; 450 INJECTION, SOLUTION INTRAVENOUS at 12:52

## 2022-11-12 RX ADMIN — TAZOBACTAM SODIUM AND PIPERACILLIN SODIUM 3.38 G: 375; 3 INJECTION, SOLUTION INTRAVENOUS at 13:35

## 2022-11-12 RX ADMIN — Medication 10 ML: at 10:00

## 2022-11-12 RX ADMIN — SODIUM CHLORIDE 1000 ML: 9 INJECTION, SOLUTION INTRAVENOUS at 05:54

## 2022-11-12 RX ADMIN — METHYLPREDNISOLONE SODIUM SUCCINATE 20 MG: 40 INJECTION, POWDER, FOR SOLUTION INTRAMUSCULAR; INTRAVENOUS at 20:40

## 2022-11-12 RX ADMIN — METHYLPREDNISOLONE SODIUM SUCCINATE 20 MG: 40 INJECTION, POWDER, FOR SOLUTION INTRAMUSCULAR; INTRAVENOUS at 13:35

## 2022-11-12 RX ADMIN — Medication 10 ML: at 20:39

## 2022-11-12 RX ADMIN — ONDANSETRON 4 MG: 2 INJECTION INTRAMUSCULAR; INTRAVENOUS at 05:55

## 2022-11-12 RX ADMIN — HYDROMORPHONE HYDROCHLORIDE 0.5 MG: 1 INJECTION, SOLUTION INTRAMUSCULAR; INTRAVENOUS; SUBCUTANEOUS at 12:58

## 2022-11-12 RX ADMIN — TAZOBACTAM SODIUM AND PIPERACILLIN SODIUM 3.38 G: 375; 3 INJECTION, SOLUTION INTRAVENOUS at 20:39

## 2022-11-12 RX ADMIN — DICYCLOMINE HYDROCHLORIDE 20 MG: 20 INJECTION, SOLUTION INTRAMUSCULAR at 05:58

## 2022-11-12 RX ADMIN — IOPAMIDOL 100 ML: 755 INJECTION, SOLUTION INTRAVENOUS at 06:34

## 2022-11-12 NOTE — PLAN OF CARE
Goal Outcome Evaluation:         New admission from ED. Pt A+O x4. Room air. NSR on monitor. NG tube in R nare secured at 60 degrees. Clamped at this time. Pt aware of NPO status. No vomiting or diarrhea at this time. Covid and flu swab sent off to lab and pending.

## 2022-11-12 NOTE — ED PROVIDER NOTES
Time: 6:29 AM EST  Arrived by: private car  Chief Complaint: Abdominal pain  History provided by: Patient  History is limited by: N/A     History of Present Illness:  Patient is a 37 y.o. year old male that presents to the emergency department with abdominal pain.  Patient notes that his abdominal pain for 3 days.  The patient notes his pain is periumbilical.  Patient has nausea no emesis.  The patient's having diarrhea.  The patient is having periodic blood in his stool.  Patient's had no fever, rigors or myalgias.  Patient's urination is normal no frequency urgency or dysuria.  Patient denies any cough, congestion or sore throat patient does note that he has a history of Crohn's and the symptoms are similar to his previous Crohn's exacerbation      Similar Symptoms Previously: Yes with Crohn's  Recently seen: No      Patient Care Team  Primary Care Provider: Dahlia Del Castillo PA-C    Past Medical History:     Allergies   Allergen Reactions   • Haloperidol Other (See Comments)     Tardive dyskenesthia  Foaming at the mouth   • Risperidone Unknown - High Severity   • Trazodone Other (See Comments)     Stuffy nose     Past Medical History:   Diagnosis Date   • Abnormal ECG    • Anxiety    • Arrhythmia    • Asthma    • Hypertension    • POTS (postural orthostatic tachycardia syndrome)    • Tachycardia      History reviewed. No pertinent surgical history.  Family History   Problem Relation Age of Onset   • Thyroid disease Mother    • Asthma Mother    • Hyperlipidemia Mother    • Diabetes Father    • Hypertension Father    • Heart disease Father        Home Medications:  Prior to Admission medications    Medication Sig Start Date End Date Taking? Authorizing Provider   albuterol (PROVENTIL HFA;VENTOLIN HFA) 108 (90 Base) MCG/ACT inhaler Inhale 2 puffs Every 4 (Four) Hours As Needed for Wheezing.    ProviderCici MD   Ascorbic Acid (BG-C PO) Take  by mouth Daily.    ProviderCici MD   atenolol (TENORMIN)  25 MG tablet Take 25 mg by mouth Daily.    Cici Montenegro MD   B Complex Vitamins (vitamin b complex) capsule capsule Take  by mouth Daily.    Cici Montenegro MD   Magnesium Chloride (MAGNESIUM DR PO) Take  by mouth Daily.    Cici Montenegro MD   VITAMIN D PO Take  by mouth Daily.    Cici Montenegro MD   dicyclomine (BENTYL) 20 MG tablet Take 1 tablet by mouth Every 6 (Six) Hours As Needed (ab pain). 4/10/22 11/12/22  Jacqui Daily APRN   HYDROcodone-acetaminophen (NORCO) 5-325 MG per tablet Take 1 tablet by mouth 4 (Four) Times a Day As Needed for Severe Pain . 4/10/22 11/12/22  Chinedu Montano MD   metoprolol succinate XL (TOPROL-XL) 25 MG 24 hr tablet Take 1 tablet by mouth Daily. 11/17/21 11/12/22  Rudolph Willis MD   ondansetron ODT (ZOFRAN-ODT) 4 MG disintegrating tablet Place 1 tablet on the tongue 4 (Four) Times a Day As Needed for Nausea or Vomiting. 4/10/22 11/12/22  Jacqui Daily APRN        Social History:   Social History     Tobacco Use   • Smoking status: Never   • Smokeless tobacco: Current     Types: Snuff   Vaping Use   • Vaping Use: Never used   Substance Use Topics   • Alcohol use: Yes     Comment: 6 PACK BEER PER WEEK   • Drug use: Not Currently     Types: Marijuana          Record Review:  I have reviewed the patient's records in [facility].     Review of Systems:  Review of Systems   Constitutional: Negative for chills, diaphoresis and fever.   HENT: Negative for congestion, postnasal drip, rhinorrhea and sore throat.    Eyes: Negative for photophobia.   Respiratory: Negative for cough, chest tightness and shortness of breath.    Cardiovascular: Negative for chest pain, palpitations and leg swelling.   Gastrointestinal: Positive for abdominal pain, blood in stool, diarrhea and nausea. Negative for vomiting.   Genitourinary: Negative for difficulty urinating, dysuria, flank pain, frequency, hematuria and urgency.   Musculoskeletal: Negative for neck pain and neck stiffness.  "  Skin: Negative for pallor and rash.   Neurological: Negative for dizziness, syncope, weakness, numbness and headaches.   Hematological: Negative for adenopathy. Does not bruise/bleed easily.   Psychiatric/Behavioral: Negative.            Physical Exam:  /78 (BP Location: Left arm, Patient Position: Sitting)   Pulse 95   Temp 98.1 °F (36.7 °C) (Oral)   Resp 16   Ht 175.3 cm (69\")   Wt 57 kg (125 lb 10.6 oz)   SpO2 100%   BMI 18.56 kg/m²     Physical Exam  Vitals and nursing note reviewed.   Constitutional:       General: He is not in acute distress.     Appearance: Normal appearance. He is not ill-appearing, toxic-appearing or diaphoretic.   HENT:      Head: Normocephalic and atraumatic.      Mouth/Throat:      Mouth: Mucous membranes are moist.   Eyes:      Pupils: Pupils are equal, round, and reactive to light.   Cardiovascular:      Rate and Rhythm: Normal rate and regular rhythm.      Pulses: Normal pulses.           Carotid pulses are 2+ on the right side and 2+ on the left side.       Radial pulses are 2+ on the right side and 2+ on the left side.        Femoral pulses are 2+ on the right side and 2+ on the left side.       Popliteal pulses are 2+ on the right side and 2+ on the left side.        Dorsalis pedis pulses are 2+ on the right side and 2+ on the left side.        Posterior tibial pulses are 2+ on the right side and 2+ on the left side.      Heart sounds: Normal heart sounds. No murmur heard.  Pulmonary:      Effort: Pulmonary effort is normal. No accessory muscle usage, respiratory distress or retractions.      Breath sounds: Normal breath sounds. No wheezing, rhonchi or rales.   Abdominal:      General: Abdomen is flat. There is no distension.      Palpations: Abdomen is soft. There is no fluid wave, mass or pulsatile mass.      Tenderness: There is abdominal tenderness in the periumbilical area. There is no right CVA tenderness, left CVA tenderness, guarding or rebound. Negative " signs include Breaux's sign and McBurney's sign.      Comments: No rigidity   Musculoskeletal:         General: No swelling, tenderness or deformity.      Cervical back: Neck supple. No tenderness.      Right lower leg: No edema.      Left lower leg: No edema.   Skin:     General: Skin is warm and dry.      Capillary Refill: Capillary refill takes less than 2 seconds.      Coloration: Skin is not jaundiced or pale.      Findings: No erythema.   Neurological:      General: No focal deficit present.      Mental Status: He is alert and oriented to person, place, and time. Mental status is at baseline.      Sensory: No sensory deficit.      Motor: No weakness.   Psychiatric:         Mood and Affect: Mood normal.         Behavior: Behavior normal.                Medications in the Emergency Department:  Medications   sodium chloride 0.9 % flush 10 mL (has no administration in time range)   sodium chloride 0.9 % flush 10 mL (has no administration in time range)   sodium chloride 0.9 % bolus 1,000 mL (has no administration in time range)   sodium chloride 0.9 % bolus 1,000 mL (1,000 mL Intravenous New Bag 11/12/22 0554)   ondansetron (ZOFRAN) injection 4 mg (4 mg Intravenous Given 11/12/22 0555)   dicyclomine (BENTYL) injection 20 mg (20 mg Intramuscular Given 11/12/22 0558)   iopamidol (ISOVUE-370) 76 % injection 100 mL (100 mL Intravenous Given 11/12/22 0634)        Labs  Lab Results (last 24 hours)     Procedure Component Value Units Date/Time    CBC & Differential [[id number]]  (Abnormal) Collected: 11/12/22 0116    Specimen: Blood Updated: 11/12/22 0125    Narrative:      The following orders were created for panel order CBC & Differential.  Procedure                               Abnormality         Status                     ---------                               -----------         ------                     CBC Auto Differential[[id number]]        Abnormal            Final result                 Please view results  for these tests on the individual orders.    Comprehensive Metabolic Panel [[id number]] Collected: 11/12/22 0116    Specimen: Blood Updated: 11/12/22 0159     Glucose 90 mg/dL      BUN 17 mg/dL      Creatinine 1.02 mg/dL      Sodium 139 mmol/L      Potassium 4.1 mmol/L      Comment: Slight hemolysis detected by analyzer. Results may be affected.        Chloride 100 mmol/L      CO2 24.7 mmol/L      Calcium 10.2 mg/dL      Total Protein 7.6 g/dL      Albumin 4.90 g/dL      ALT (SGPT) 12 U/L      AST (SGOT) 23 U/L      Alkaline Phosphatase 81 U/L      Total Bilirubin 0.5 mg/dL      Globulin 2.7 gm/dL      A/G Ratio 1.8 g/dL      BUN/Creatinine Ratio 16.7     Anion Gap 14.3 mmol/L      eGFR 98.3 mL/min/1.73      Comment: National Kidney Foundation and American Society of Nephrology (ASN) Task Force recommended calculation based on the Chronic Kidney Disease Epidemiology Collaboration (CKD-EPI) equation refit without adjustment for race.       Narrative:      GFR Normal >60  Chronic Kidney Disease <60  Kidney Failure <15      Lipase [[id number]]  (Normal) Collected: 11/12/22 0116    Specimen: Blood Updated: 11/12/22 0159     Lipase 23 U/L     CBC Auto Differential [[id number]]  (Abnormal) Collected: 11/12/22 0116    Specimen: Blood Updated: 11/12/22 0125     WBC 14.36 10*3/mm3      RBC 4.83 10*6/mm3      Hemoglobin 14.2 g/dL      Hematocrit 42.4 %      MCV 87.8 fL      MCH 29.4 pg      MCHC 33.5 g/dL      RDW 12.7 %      RDW-SD 40.5 fl      MPV 10.5 fL      Platelets 327 10*3/mm3      Neutrophil % 84.6 %      Lymphocyte % 6.3 %      Monocyte % 5.8 %      Eosinophil % 2.4 %      Basophil % 0.6 %      Immature Grans % 0.3 %      Neutrophils, Absolute 12.15 10*3/mm3      Lymphocytes, Absolute 0.90 10*3/mm3      Monocytes, Absolute 0.84 10*3/mm3      Eosinophils, Absolute 0.35 10*3/mm3      Basophils, Absolute 0.08 10*3/mm3      Immature Grans, Absolute 0.04 10*3/mm3      nRBC 0.0 /100 WBC            Imaging:  CT Abdomen Pelvis  With Contrast   Final Result    There is a suspected mechanical small bowel obstruction with a transition zone in the    right lower quadrant, probably involving distal ileum.  It may be related to a nonspecific    infectious/inflammatory enteritis (such is acute exacerbation of Crohn's enteritis).  Probably    minimal, if any, mural thickening of the small bowel is appreciated, however.  No definite CT    evidence of an ischemic enteritis.  No pneumoperitoneum or pneumatosis.  No portal or mesenteric    venous gas.  No definite intraperitoneal or retroperitoneal abscess.  No acute appendicitis.     Consider close interval clinical and imaging follow-up.  The patient may benefit from    administration of oral contrast agent and follow-up abdominal/pelvic CT examination and/or plain    radiographs (KUBs) if clinically warranted.  Please see above comments for further detail.             Please note that portions of this note were completed with a voice recognition program.       CASEY VANEGAS JR, MD          Electronically Signed and Approved By: CASEY VANEGAS JR, MD on 11/12/2022 at 6:56                               Procedures:  Procedures    Progress                            Medical Decision Making:  MDM  Number of Diagnoses or Management Options  History of Crohn's disease  Small bowel obstruction (HCC)  Diagnosis management comments:     The patient refused the COVID and flu swabs.       Amount and/or Complexity of Data Reviewed  Clinical lab tests: reviewed  Tests in the radiology section of CPT®: reviewed  Tests in the medicine section of CPT®: reviewed  Discuss the patient with other providers: yes (I discussed case with the general surgeon, Dr. Earl.  She agrees with the NG tube.  She will see the patient in consultation    I discussed the case with Dr. Saunders, gastroenterologist.  He will see the patient in consultation.  He request that steroids be held until he sees the patient.    I discussed  the case with Dr. LENNY Ennis.  She is agree to admit the patient to the hospital.  She will consult both gastroenterology and general surgery)               Final diagnoses:   Small bowel obstruction (HCC)   History of Crohn's disease        Disposition:  ED Disposition     ED Disposition   Decision to Admit    Condition   --    Comment   Level of Care: Telemetry [5]   Diagnosis: Small bowel obstruction (HCC) [194346]   Admitting Physician: ROXY ENNIS [[id number]]   Attending Physician: ROXY ENNIS [[id number]]   Isolate for COVID?: No [0]   Certification: I Certify That Inpatient Hospital Services Are Medically Necessary For Greater Than 2 Midnights               This medical record created using voice recognition software.    DO Christina Duong Scott, DO  11/12/22 2029

## 2022-11-12 NOTE — H&P
[facility]   HISTORY AND PHYSICAL    Patient Name: Feliberto Santillan  : 1985  MRN: [id number]  Primary Care Physician:  Dahlia Del Castillo PA-C  Date of admission: 2022    Subjective   Subjective     Chief Complaint: Crohn's disease, small bowel obstruction    HPI:    Feliberto Santillan is a 37 y.o. male who has a known diagnosis of Crohn's disease was admitted with abdomen pain for 3 days in the periumbilical region.  He has no nausea or vomiting.  He has been having diarrhea and periodic blood in his stool due to Crohn's.  He has not had a BM or passed any gas.  History of SVT for which he is on atenolol and takes vitamins and electrolytes.     Review of Systems   Review of Systems   Constitutional: Positive for fatigue. Activity change: in bed.   HENT: Negative.    Eyes: Negative.    Respiratory: Negative.    Cardiovascular: Negative.    Gastrointestinal: Positive for abdominal distention, abdominal pain and constipation.   Endocrine: Negative.    Genitourinary: Negative.    Musculoskeletal: Negative.    Skin: Negative.    Allergic/Immunologic: Negative.    Neurological: Negative.    Hematological: Negative.    Psychiatric/Behavioral: Negative.        Personal History     Past Medical History:   Diagnosis Date   • Abnormal ECG    • Anxiety    • Arrhythmia    • Asthma    • Hypertension    • POTS (postural orthostatic tachycardia syndrome)    • Tachycardia        History reviewed. No pertinent surgical history.    Family History: family history includes Asthma in his mother; Diabetes in his father; Heart disease in his father; Hyperlipidemia in his mother; Hypertension in his father; Thyroid disease in his mother. Otherwise pertinent FHx was reviewed and not pertinent to current issue.    Social History:  reports that he has never smoked. His smokeless tobacco use includes snuff. He reports current alcohol use. He reports that he does not currently use drugs after having used the following drugs:  Marijuana.    Home Medications:  Ascorbic Acid, Magnesium Chloride, Vitamin D, albuterol sulfate HFA, atenolol, and vitamin b complex      Allergies:  Allergies   Allergen Reactions   • Haloperidol Other (See Comments)     Tardive dyskenesthia  Foaming at the mouth   • Risperidone Unknown - High Severity   • Trazodone Other (See Comments)     Stuffy nose       Objective   Objective     Vitals:   Temp:  [98.1 °F (36.7 °C)] 98.1 °F (36.7 °C)  Heart Rate:  [95] 95  Resp:  [16] 16  BP: (126)/(78) 126/78  Physical Exam    Constitutional: Awake, alert   Eyes: PERRLA, sclerae anicteric, no conjunctival injection   HENT: NCAT, mucous membranes moist   Neck: Supple, no thyromegaly, no lymphadenopathy, trachea midline   Respiratory: Clear to auscultation bilaterally, nonlabored respirations    Cardiovascular: RRR, no murmurs, rubs, or gallops, palpable pedal pulses bilaterally   Gastrointestinal: Positive bowel sounds, soft, nontender, nondistended.  Minimal tenderness   Musculoskeletal: No bilateral ankle edema, no clubbing or cyanosis to extremities   Psychiatric: Appropriate affect, cooperative   Neurologic: Oriented x 3, strength symmetric in all extremities, Cranial Nerves grossly intact to confrontation, speech clear   Skin: No rashes     Result Review    Result Review:  I have personally reviewed the results from the time of this admission to 11/12/2022 08:00 EST and agree with these findings:  [x]  Laboratory  []  Microbiology  [x]  Radiology  []  EKG/Telemetry   []  Cardiology/Vascular   []  Pathology  []  Old records  []  Other:  Most notable findings include: Crohn's colitis and small bowel obstruction  Assessment & Plan   Assessment / Plan     Brief Patient Summary:  Feliberto Santillan is a 37 y.o. male who has a known diagnosis of Crohn's colitis.  He came in with similar pains like his previous episodes of colitis.  X-ray abdomen showed CT abdomen showed persistent gaseous distension of small intestine due to  small bowel obstruction.  CT abdomen and pelvis done shows suspected mechanical small bowel obstruction with a transition zone in the right lower quadrant probably involving distal ileum.  May be related to nonspecific infectious inflammatory enteritis such as acute exacerbation of Crohn's enteritis.  Minimal if any mural thickening of the small bowel is appreciated no definite abscess    Active Hospital Problems:  Active Hospital Problems    Diagnosis    • **Small bowel obstruction (HCC)      Plan:   Patient admitted, n.p.o.   Surgery consult Dr. Earl  GI consult Dr. Saunders who started him on Solu-Medrol 20 mg IV every 8 hours  Check CRP  IV fluids  IV pain meds  IV antibiotics  IV metoprolol as needed    DVT prophylaxis:  No DVT prophylaxis order currently exists.    CODE STATUS:    Level Of Support Discussed With: Patient  Code Status (Patient has no pulse and is not breathing): CPR (Attempt to Resuscitate)  Medical Interventions (Patient has pulse or is breathing): Full Support    Admission Status:  I believe this patient meets inpatient status.    Electronically signed by Yasmani Garcia MD, 11/12/22, 8:00 AM EST.

## 2022-11-12 NOTE — CONSULTS
Chief Complaint: Abdominal Pain and Vomiting    Subjective         History of Present Illness  Feliberto Santillan is a 37 y.o. male presents to [facility] 4TH FLOOR MEDICAL TELEMETRY UNIT to be seen for abdominal pain and diarrhea per ER.  He has a hx of crohns and has been off his maintenance medications and was seen in ER and ct scan showed SBO with transition point in RLQ near terminal ileum.  His results are shown below:    Narrative & Impression   PROCEDURE:  CT ABDOMEN PELVIS W CONTRAST     COMPARISONS:           12/21/2020; 4/10/2022.     INDICATIONS:  37-year-old male generalized abdominal pain & vomiting for 1 day.     TECHNIQUE:    After obtaining the patient's consent, 593 CT images were created with non-ionic   intravenous contrast material.  No oral contrast agent was administered for the study.     PROTOCOL:     Standard CT imaging protocol performed.                 RADIATION:      Total DLP: 267.3 mGy*cm.               Automated exposure control was utilized to minimize radiation dose.   CONTRAST:      100 mL Isovue 370 I.V.     FINDINGS:        There is dilated small bowel proximally and nondilated small bowel distally.  A   mechanical small bowel obstruction is suspected.  The transition zone for the obstruction probably   involves right lower quadrant distal ileum, as on image 68 of series 201 and adjacent images, just   proximal to the terminal ileum.  The findings may be related to an infectious/inflammatory process,   including Crohn's enteritis.  An adhesion cannot be excluded.  Other causes of mechanical small   bowel obstruction cannot be excluded (such as volvulus or internal hernia).  No pneumoperitoneum or   pneumatosis is seen.  No definite ascites.  No extraluminal intraperitoneal or retroperitoneal   fluid collection is seen to suggest abscess.  No portal or mesenteric venous gas is seen is seen to   suggest ischemic enteritis.  No acute appendicitis.  No acute colitis or  diverticulitis.  There is   a small hiatal hernia.  No acute infiltrate is seen in the partially imaged lung bases.  No   hydronephrosis or obstructive uropathy due to a ureteral calculus.  No definite nonobstructing   nephrolithiasis.  No acute pyelonephritis.  There may be small benign hepatic cysts, seen   previously.  No adrenal mass.  No acute pancreatitis.  No gallstones or acute cholecystitis.  No   acute fracture or aggressive osseous lesion is suggested.     IMPRESSION:               There is a suspected mechanical small bowel obstruction with a transition zone in the   right lower quadrant, probably involving distal ileum.  It may be related to a nonspecific   infectious/inflammatory enteritis (such is acute exacerbation of Crohn's enteritis).  Probably   minimal, if any, mural thickening of the small bowel is appreciated, however.  No definite CT   evidence of an ischemic enteritis.  No pneumoperitoneum or pneumatosis.  No portal or mesenteric   venous gas.  No definite intraperitoneal or retroperitoneal abscess.  No acute appendicitis.    Consider close interval clinical and imaging follow-up.  The patient may benefit from   administration of oral contrast agent and follow-up abdominal/pelvic CT examination and/or plain   radiographs (KUBs) if clinically warranted.  Please see above comments for further detail.              Objective     Past Medical History:   Diagnosis Date   • Abnormal ECG    • Anxiety    • Arrhythmia    • Asthma    • Hypertension    • POTS (postural orthostatic tachycardia syndrome)    • Tachycardia        History reviewed. No pertinent surgical history.      Current Facility-Administered Medications:   •  acetaminophen (TYLENOL) tablet 650 mg, 650 mg, Nasogastric, Q4H PRN, Yasmani Garcia MD  •  albuterol (PROVENTIL) nebulizer solution 0.083% 2.5 mg/3mL, 2.5 mg, Nebulization, Q6H PRN, Yasmani Garcia MD  •  dextrose 5 % and sodium chloride 0.45 % infusion, 100 mL/hr, Intravenous,  Continuous, Yasmani Garcia MD, Last Rate: 100 mL/hr at 11/12/22 1252, 100 mL/hr at 11/12/22 1252  •  HYDROmorphone (DILAUDID) injection 0.5 mg, 0.5 mg, Intravenous, Q4H PRN, Yasmani Garcia MD, 0.5 mg at 11/12/22 1258  •  methylPREDNISolone sodium succinate (SOLU-Medrol) injection 20 mg, 20 mg, Intravenous, Q8H, Roel Saunders MD, 20 mg at 11/12/22 1335  •  metoprolol tartrate (LOPRESSOR) injection 5 mg, 5 mg, Intravenous, Q4H PRN, Yasmani Garcia MD  •  nitroglycerin (NITROSTAT) SL tablet 0.4 mg, 0.4 mg, Sublingual, Q5 Min PRN, Yasmani Garcia MD  •  Pharmacy to Dose Zosyn, , Does not apply, Continuous PRN, Yasmani Garcia MD  •  piperacillin-tazobactam (ZOSYN) 3.375 g in iso-osmotic dextrose 50 ml (premix), 3.375 g, Intravenous, Q8H, Yasmani Garcia MD  •  sodium chloride 0.9 % bolus 1,000 mL, 1,000 mL, Intravenous, Once, Aristides Vasquez DO  •  sodium chloride 0.9 % flush 10 mL, 10 mL, Intravenous, PRN, Aristides Vasquez DO  •  [COMPLETED] Insert peripheral IV, , , Once **AND** sodium chloride 0.9 % flush 10 mL, 10 mL, Intravenous, PRN, Aristides Vasquez DO  •  sodium chloride 0.9 % flush 10 mL, 10 mL, Intravenous, PRN, Yasmani Garcia MD  •  sodium chloride 0.9 % flush 10 mL, 10 mL, Intravenous, Q12H, Yasmani Garcia MD, 10 mL at 11/12/22 1000  •  sodium chloride 0.9 % infusion 40 mL, 40 mL, Intravenous, PRN, Yasmani Garcia MD    Allergies   Allergen Reactions   • Haloperidol Other (See Comments)     Tardive dyskenesthia  Foaming at the mouth   • Risperidone Unknown - High Severity   • Trazodone Other (See Comments)     Stuffy nose        Family History   Problem Relation Age of Onset   • Thyroid disease Mother    • Asthma Mother    • Hyperlipidemia Mother    • Diabetes Father    • Hypertension Father    • Heart disease Father        Social History     Socioeconomic History   • Marital status: Single   Tobacco Use   • Smoking status: Never   • Smokeless tobacco: Current     Types: Snuff   Vaping Use   • Vaping  "Use: Never used   Substance and Sexual Activity   • Alcohol use: Yes     Comment: 6 PACK BEER PER WEEK   • Drug use: Not Currently     Types: Marijuana   • Sexual activity: Defer       Vital Signs:   /75 (BP Location: Left arm, Patient Position: Lying)   Pulse 113   Temp 97.7 °F (36.5 °C) (Oral)   Resp 16   Ht 175.3 cm (69\")   Wt 57 kg (125 lb 10.6 oz)   SpO2 98%   BMI 18.56 kg/m²    Review of Systems   Constitutional: Negative for fatigue and fever.   HENT: Negative for ear pain and sore throat.    Eyes: Negative for blurred vision.   Respiratory: Negative for cough and shortness of breath.    Cardiovascular: Negative for chest pain and leg swelling.   Gastrointestinal: Positive for abdominal pain, blood in stool, diarrhea and nausea. Negative for constipation and vomiting.   Musculoskeletal: Negative for arthralgias and myalgias.   Skin: Negative for rash and skin lesions.   Neurological: Negative for weakness and headache.   Hematological: Negative for adenopathy. Does not bruise/bleed easily.   Psychiatric/Behavioral: Negative for sleep disturbance and depressed mood.       Physical Exam  Vitals and nursing note reviewed.   Constitutional:       General: He is not in acute distress.     Appearance: Normal appearance. He is well-developed.   HENT:      Head: Normocephalic and atraumatic.   Eyes:      Extraocular Movements: Extraocular movements intact.      Pupils: Pupils are equal, round, and reactive to light.   Cardiovascular:      Pulses: Normal pulses.   Pulmonary:      Effort: Pulmonary effort is normal. No retractions.      Breath sounds: Normal air entry. No wheezing.   Abdominal:      General: There is no distension.      Palpations: Abdomen is soft.      Tenderness: There is no abdominal tenderness.      Hernia: No hernia is present.   Musculoskeletal:         General: No swelling or deformity.      Cervical back: Neck supple.   Skin:     General: Skin is warm and dry.      Findings: No " erythema.   Neurological:      General: No focal deficit present.      Mental Status: He is alert and oriented to person, place, and time.      Motor: Motor function is intact.   Psychiatric:         Mood and Affect: Mood normal.         Thought Content: Thought content normal.          Result Review :     Common labs    Common Labs 11/23/21 11/23/21 4/9/22 4/9/22 11/12/22 11/12/22    0627 0627 2044 2044 0116 0116   Glucose  123 (A)  101 (A)  90   BUN  9  6  17   Creatinine  0.92  0.97  1.02   eGFR Non African Am  94       Sodium  138  141  139   Potassium  3.9  3.5  4.1   Chloride  100  104  100   Calcium  11.1 (A)  9.8  10.2   Albumin  5.70 (A)  4.80  4.90   Total Bilirubin  0.8  0.9  0.5   Alkaline Phosphatase  89  74  81   AST (SGOT)  45 (A)  26  23   ALT (SGPT)  46 (A)  18  12   WBC 8.53  9.51  14.36 (A)    Hemoglobin 16.3  13.8  14.2    Hematocrit 47.3  40.6  42.4    Platelets 346  282  327    (A) Abnormal value       Comments are available for some flowsheets but are not being displayed.           Data reviewed: Radiologic studies ct scan        Assessment and Plan    Diagnoses and all orders for this visit:    1. Small bowel obstruction (HCC) (Primary)    2. History of Crohn's disease    Other orders  Keep ngt  Agree with treating acute crohns flare medically and only resorting to surgery if absolutely necessary- discussed with patient  NPO  Continue steroids  Await gi function      This document has been electronically signed by Leticia Earl MD  November 12, 2022 16:17 EST

## 2022-11-12 NOTE — PLAN OF CARE
Goal Outcome Evaluation:      VSS throughout shift. Pt on continuous IV fluids at 100 ml/hr. Abx infusing, steroids administered as prescribed. No vomiting or diarrhea this shift. Pain medication administered x1. NG tube on intermittent suction per gastro MD, however, minimal output. MD aware- states this is ok. Will cont. To monitor and provide pt care.

## 2022-11-12 NOTE — CONSULTS
[facility]   Consult Note    Patient Name: Feliberto Santillan  : 1985  MRN: [id number]  Primary Care Physician:  Dahlia Del Castillo PA-C  Referring Physician: No ref. provider found  Date of admission: 2022    Subjective   Subjective     Reason for Consult/ Chief Complaint: Abdominal pain.  Crohn's disease    HPI:  Feliberto Santillan is a 37 y.o. male who has a history of Crohn's disease.  Details of his disease are unclear.  He reports having been diagnosed when he was 19 or 20 years of age.  Last note from Dr. Dukes is noted in 2018.  He apparently had episode of small bowel obstruction in the past.  He presented with abdominal pain, nausea and vomiting.    In the emergency room a CT of the abdomen with IV contrast demonstrated suspected mechanical small bowel obstruction with a transition zone in the right lower quadrant probably involving the distal ileum.  No evidence of any fistulous disease.    CT of the abdomen on 4/10/2022 showed mild nonspecific diffuse colonic wall thickening  Laboratory data showed a normal CHEM profile.  CBC showed leukocytosis with white count of 14.38.  Hemoglobin is normal.  Review of Systems     All systems were reviewed and negative except for: GI complaints are as described.  Weight loss.  No skin rashes or arthritis pain      Personal History     Past Medical History:   Diagnosis Date   • Abnormal ECG    • Anxiety    • Arrhythmia    • Asthma    • Hypertension    • POTS (postural orthostatic tachycardia syndrome)    • Tachycardia        History reviewed. No pertinent surgical history.    Family History: family history includes Asthma in his mother; Diabetes in his father; Heart disease in his father; Hyperlipidemia in his mother; Hypertension in his father; Thyroid disease in his mother. Otherwise pertinent FHx was reviewed and not pertinent to current issue.    Social History:  reports that he has never smoked. His smokeless tobacco use includes snuff. He  reports current alcohol use. He reports that he does not currently use drugs after having used the following drugs: Marijuana.    Home Medications:  Ascorbic Acid, Magnesium Chloride, Vitamin D, albuterol sulfate HFA, atenolol, and vitamin b complex    Allergies:  Allergies   Allergen Reactions   • Haloperidol Other (See Comments)     Tardive dyskenesthia  Foaming at the mouth   • Risperidone Unknown - High Severity   • Trazodone Other (See Comments)     Stuffy nose       Objective    Objective     Vitals:   Temp:  [97.7 °F (36.5 °C)-98.1 °F (36.7 °C)] 97.7 °F (36.5 °C)  Heart Rate:  [] 113  Resp:  [16] 16  BP: (123-126)/(75-88) 126/75    Physical Exam:   Constitutional: Awake, alert.  Appears chronically ill.   Eyes: PERRLA, sclerae anicteric, no conjunctival injection   HENT: NCAT, mucous membranes moist   Neck: Supple, no thyromegaly, no lymphadenopathy, trachea midline   Respiratory: Clear to auscultation bilaterally, nonlabored respirations    Cardiovascular: RRR, no murmurs, rubs, or gallops, palpable pedal pulses bilaterally   Gastrointestinal: Positive bowel sounds, soft, nontender, nondistended   Musculoskeletal: No bilateral ankle edema, no clubbing or cyanosis to extremities   Psychiatric: Appropriate affect, cooperative   Neurologic: Oriented x 3, strength symmetric in all extremities, Cranial Nerves grossly intact to confrontation, speech clear   Skin: No rashes     Scheduled Meds:sodium chloride, 1,000 mL, Intravenous, Once  sodium chloride, 10 mL, Intravenous, Q12H      Continuous Infusions:dextrose 5 % and sodium chloride 0.45 %, 100 mL/hr  Pharmacy Consult - Pharmacy to dose,       PRN Meds:.•  acetaminophen  •  albuterol  •  metoprolol tartrate  •  nitroglycerin  •  Pharmacy Consult - Pharmacy to dose  •  sodium chloride  •  [COMPLETED] Insert peripheral IV **AND** sodium chloride  •  sodium chloride  •  sodium chloride      Result Review    Result Review:  I have personally reviewed the  results from the time of this admission to 11/12/2022 12:49 EST and agree with these findings:    Lab Results (last 24 hours)     Procedure Component Value Units Date/Time    Influenza Antigen, Rapid - Swab, Nasopharynx [[id number]]  (Normal) Collected: 11/12/22 0948    Specimen: Swab from Nasopharynx Updated: 11/12/22 1041     Influenza A Ag, EIA Negative     Influenza B Ag, EIA Negative    COVID-19,APTIMA PANTHER(DANIKA),[facility], NP/OP SWAB IN UTM/VTM/SALINE TRANSPORT MEDIA,24 HR TAT - Swab, Nasal Cavity [[id number]] Collected: 11/12/22 0948    Specimen: Swab from Nasal Cavity Updated: 11/12/22 1005    Comprehensive Metabolic Panel [[id number]] Collected: 11/12/22 0116    Specimen: Blood Updated: 11/12/22 0159     Glucose 90 mg/dL      BUN 17 mg/dL      Creatinine 1.02 mg/dL      Sodium 139 mmol/L      Potassium 4.1 mmol/L      Comment: Slight hemolysis detected by analyzer. Results may be affected.        Chloride 100 mmol/L      CO2 24.7 mmol/L      Calcium 10.2 mg/dL      Total Protein 7.6 g/dL      Albumin 4.90 g/dL      ALT (SGPT) 12 U/L      AST (SGOT) 23 U/L      Alkaline Phosphatase 81 U/L      Total Bilirubin 0.5 mg/dL      Globulin 2.7 gm/dL      A/G Ratio 1.8 g/dL      BUN/Creatinine Ratio 16.7     Anion Gap 14.3 mmol/L      eGFR 98.3 mL/min/1.73      Comment: National Kidney Foundation and American Society of Nephrology (ASN) Task Force recommended calculation based on the Chronic Kidney Disease Epidemiology Collaboration (CKD-EPI) equation refit without adjustment for race.       Narrative:      GFR Normal >60  Chronic Kidney Disease <60  Kidney Failure <15      Lipase [[id number]]  (Normal) Collected: 11/12/22 0116    Specimen: Blood Updated: 11/12/22 0159     Lipase 23 U/L     [place] Draw [[id number]] Collected: 11/12/22 0116    Specimen: Blood Updated: 11/12/22 0131    Narrative:      The following orders were created for panel order [place] Draw.  Procedure                               Abnormality          Status                     ---------                               -----------         ------                     Green Top (Gel)[[id number]]                                  Final result               Lavender Top[[id number]]                                     Final result               Gold Top - SST[[id number]]                                   Final result               Light Blue Top[[id number]]                                   Final result                 Please view results for these tests on the individual orders.    Green Top (Gel) [[id number]] Collected: 11/12/22 0116    Specimen: Blood Updated: 11/12/22 0131     Extra Tube Hold for add-ons.     Comment: Auto resulted.       Lavender Top [[id number]] Collected: 11/12/22 0116    Specimen: Blood Updated: 11/12/22 0131     Extra Tube hold for add-on     Comment: Auto resulted       Gold Top - SST [[id number]] Collected: 11/12/22 0116    Specimen: Blood Updated: 11/12/22 0131     Extra Tube Hold for add-ons.     Comment: Auto resulted.       Light Blue Top [[id number]] Collected: 11/12/22 0116    Specimen: Blood Updated: 11/12/22 0131     Extra Tube Hold for add-ons.     Comment: Auto resulted       CBC & Differential [[id number]]  (Abnormal) Collected: 11/12/22 0116    Specimen: Blood Updated: 11/12/22 0125    Narrative:      The following orders were created for panel order CBC & Differential.  Procedure                               Abnormality         Status                     ---------                               -----------         ------                     CBC Auto Differential[[id number]]        Abnormal            Final result                 Please view results for these tests on the individual orders.    CBC Auto Differential [[id number]]  (Abnormal) Collected: 11/12/22 0116    Specimen: Blood Updated: 11/12/22 0125     WBC 14.36 10*3/mm3      RBC 4.83 10*6/mm3      Hemoglobin 14.2 g/dL      Hematocrit 42.4 %      MCV 87.8 fL      MCH 29.4 pg       MCHC 33.5 g/dL      RDW 12.7 %      RDW-SD 40.5 fl      MPV 10.5 fL      Platelets 327 10*3/mm3      Neutrophil % 84.6 %      Lymphocyte % 6.3 %      Monocyte % 5.8 %      Eosinophil % 2.4 %      Basophil % 0.6 %      Immature Grans % 0.3 %      Neutrophils, Absolute 12.15 10*3/mm3      Lymphocytes, Absolute 0.90 10*3/mm3      Monocytes, Absolute 0.84 10*3/mm3      Eosinophils, Absolute 0.35 10*3/mm3      Basophils, Absolute 0.08 10*3/mm3      Immature Grans, Absolute 0.04 10*3/mm3      nRBC 0.0 /100 WBC              No results found for: NOCARDIACX, STOOLCX      Imaging Results (Last 24 Hours)     Procedure Component Value Units Date/Time    XR Abdomen KUB [[id number]] Collected: 11/12/22 0931     Updated: 11/12/22 0935    Narrative:      PROCEDURE: XR ABDOMEN KUB     COMPARISON: [facility], CT, CT ABDOMEN PELVIS W CONTRAST, 11/12/2022, 6:24.  [facility], CR, ABDOMEN SERIES ACUTE, 9/10/2020, 1:42.     INDICATIONS: ng tube placement     FINDINGS:   Tip of the enteric tube terminates in the proximal stomach.  Side port is located in the region of   the gastric cardia.  There is gaseous distention of small bowel loops, corresponding to small bowel   distention seen on recent CT.  There is intravenous contrast in the renal collecting systems,   ureters, and urinary bladder from recent CT.  Included lung bases are clear.       Impression:         1. Tip of the enteric tube terminates in the proximal stomach.  2. Persistent gaseous small bowel distention, consistent with small bowel obstruction.            SHON PERKINS MD         Electronically Signed and Approved By: SHON PERKINS MD on 11/12/2022 at 9:31                     CT Abdomen Pelvis With Contrast [[id number]] Collected: 11/12/22 0656     Updated: 11/12/22 0659    Narrative:      PROCEDURE: CT ABDOMEN PELVIS W CONTRAST     COMPARISONS: 12/21/2020; 4/10/2022.     INDICATIONS: 37-year-old male generalized abdominal pain &  vomiting for 1 day.     TECHNIQUE: After obtaining the patient's consent, 593 CT images were created with non-ionic   intravenous contrast material.  No oral contrast agent was administered for the study.     PROTOCOL:   Standard CT imaging protocol performed.      RADIATION:   Total DLP: 267.3 mGy*cm.    Automated exposure control was utilized to minimize radiation dose.   CONTRAST: 100 mL Isovue 370 I.V.     FINDINGS: There is dilated small bowel proximally and nondilated small bowel distally.  A   mechanical small bowel obstruction is suspected.  The transition zone for the obstruction probably   involves right lower quadrant distal ileum, as on image 68 of series 201 and adjacent images, just   proximal to the terminal ileum.  The findings may be related to an infectious/inflammatory process,   including Crohn's enteritis.  An adhesion cannot be excluded.  Other causes of mechanical small   bowel obstruction cannot be excluded (such as volvulus or internal hernia).  No pneumoperitoneum or   pneumatosis is seen.  No definite ascites.  No extraluminal intraperitoneal or retroperitoneal   fluid collection is seen to suggest abscess.  No portal or mesenteric venous gas is seen is seen to   suggest ischemic enteritis.  No acute appendicitis.  No acute colitis or diverticulitis.  There is   a small hiatal hernia.  No acute infiltrate is seen in the partially imaged lung bases.  No   hydronephrosis or obstructive uropathy due to a ureteral calculus.  No definite nonobstructing   nephrolithiasis.  No acute pyelonephritis.  There may be small benign hepatic cysts, seen   previously.  No adrenal mass.  No acute pancreatitis.  No gallstones or acute cholecystitis.  No   acute fracture or aggressive osseous lesion is suggested.       Impression:       There is a suspected mechanical small bowel obstruction with a transition zone in the   right lower quadrant, probably involving distal ileum.  It may be related to a  nonspecific   infectious/inflammatory enteritis (such is acute exacerbation of Crohn's enteritis).  Probably   minimal, if any, mural thickening of the small bowel is appreciated, however.  No definite CT   evidence of an ischemic enteritis.  No pneumoperitoneum or pneumatosis.  No portal or mesenteric   venous gas.  No definite intraperitoneal or retroperitoneal abscess.  No acute appendicitis.    Consider close interval clinical and imaging follow-up.  The patient may benefit from   administration of oral contrast agent and follow-up abdominal/pelvic CT examination and/or plain   radiographs (KUBs) if clinically warranted.  Please see above comments for further detail.          Please note that portions of this note were completed with a voice recognition program.     CASEY VANEGAS JR, MD         Electronically Signed and Approved By: CASEY VANEGAS JR, MD on 11/12/2022 at 6:56                             Assessment & Plan   Assessment / Plan     Brief Patient Summary:  Feliberto Santillan is a 37 y.o. male who has a following assessment    Assessment    #1 small bowel obstruction.    Patient appears to have a transition of the right lower quadrant consistent with obstruction of the terminal ileum.  Most likely from his known history of IBD.  Details of his IBD is unclear but he most likely has ileocolonic Crohn's.  A prior CT scan showed diffuse colitis.  No evidence of any fistulous disease or any proximal small bowel disease.    #2 inflammatory bowel disease    Most likely ileocolonic Crohn's.  Details unclear.    Active Hospital Problems:  Active Hospital Problems    Diagnosis    • **Small bowel obstruction (HCC)      Plan:     #1 agree with complete bowel rest and NG tube decompression.  Placed to low intermittent suction    #2 start Solu-Medrol 20 mg IV every 8 hours    #3 surgical consultation    #4 check CRP    #5 if patient's small bowel obstruction resolves and should consider eventual colonoscopy to  assess disease evaluation.  Most likely will eventually need biologic therapy.      Electronically signed by Roel Saunders MD, 11/12/22, 12:49 PM EST.

## 2022-11-12 NOTE — CONSULTS
"Nutrition Services    Patient Name: Feliberto Santillan  YOB: 1985  MRN: [id number]  Admission date: 11/12/2022      CLINICAL NUTRITION ASSESSMENT      Reason for Assessment  Physician consult     H&P:    Past Medical History:   Diagnosis Date   • Abnormal ECG    • Anxiety    • Arrhythmia    • Asthma    • Hypertension    • POTS (postural orthostatic tachycardia syndrome)    • Tachycardia         Current Problems:   Active Hospital Problems    Diagnosis    • **Small bowel obstruction (HCC)         Nutrition/Diet History         Narrative     Patient admitted with abdominal pain and blood in stool.  History of Crohn's disease with similar symptoms during previous flares.  CT abd&pelvis shows mechanical small bowel obstruction.  Surgery has been consulted.      Patient has stable weight history with borderline BMI of 18.6  RD will continue to follow for plan of care and to provide nutrition intervention as appropriate.  If patient is to undergo surgical intervention, may require nutrition support in the form of TPN.      If TPN is indicated, recommend:  Clinimix D15%, AA5% at goal rate of 65 ml/hr with 250 ml of 20% Intralipid daily.     Will provide 1609 kcal, 78 g pro, 1810 ml fluid.     Anthropometrics        Current Height, Weight Height: 175.3 cm (69\")  Weight: 57 kg (125 lb 10.6 oz)   Current BMI Body mass index is 18.56 kg/m².       Weight Hx  Wt Readings from Last 30 Encounters:   11/12/22 0109 57 kg (125 lb 10.6 oz)   04/09/22 2037 55.6 kg (122 lb 9.2 oz)   11/23/21 0624 56 kg (123 lb 7.3 oz)   11/17/21 1345 56.2 kg (124 lb)   06/25/21 2232 57.6 kg (126 lb 15.8 oz)   09/02/18 1152 63.5 kg (140 lb)   08/06/18 0000 60 kg (132 lb 4 oz)   03/06/18 0000 61.3 kg (135 lb 4 oz)            Wt Change Observation Stable x 1.5 years     Estimated/Assessed Needs       Energy Requirements 30-35 kcal/kg   EST Needs (kcal/day) 8498-8972 kcal        Protein Requirements 1.2-1.5 g/kg    EST Daily Needs (g/day) " 68-86 g        Fluid Requirements 30 ml/kg    Estimated Needs (mL/day) 1710     Labs/Medications         Pertinent Labs Reviewed.   Results from last 7 days   Lab Units 11/12/22  0116   SODIUM mmol/L 139   POTASSIUM mmol/L 4.1   CHLORIDE mmol/L 100   CO2 mmol/L 24.7   BUN mg/dL 17   CREATININE mg/dL 1.02   CALCIUM mg/dL 10.2   BILIRUBIN mg/dL 0.5   ALK PHOS U/L 81   ALT (SGPT) U/L 12   AST (SGOT) U/L 23   GLUCOSE mg/dL 90     Results from last 7 days   Lab Units 11/12/22  0116   HEMOGLOBIN g/dL 14.2   HEMATOCRIT % 42.4     Coronavirus (COVID-19)   Date Value Ref Range Status   12/21/2020 NOT DETECTED NA Final     Comment:     The SARS-CoV-2 assay is a real-time, RT-PCR test intended  for the qualitative detection of nucleic acid from the  SARS-CoV-2 in respiratory specimens from individuals,  testing performed at [facility].       No results found for: HGBA1C      Pertinent Medications Reviewed.     Current Nutrition Orders & Evaluation of Intake       Oral Nutrition     Current PO Diet NPO Diet NPO Type: Strict NPO   Supplement No active supplement orders       Malnutrition Severity Assessment                Nutrition Diagnosis         Nutrition Dx Problem 1 Inadequate energy Intake related to Inability to consume sufficient energy as evidenced by NPO       Nutrition Intervention         Will follow for plan of care.  Diet advancement vs need for nutrition support.       Medical Nutrition Therapy/Nutrition Education          Learner     Readiness N/A  N/A     Method     Response N/A  N/A     Monitor/Evaluation        Monitor Per protocol, I&O, Pertinent labs, Weight, GI status, Diet advancement       Nutrition Discharge Plan         To be determined       Electronically signed by:  Alisha Ramon RD  11/12/22 10:12 EST

## 2022-11-13 ENCOUNTER — APPOINTMENT (OUTPATIENT)
Dept: CT IMAGING | Facility: HOSPITAL | Age: 37
End: 2022-11-13

## 2022-11-13 LAB
ALBUMIN SERPL-MCNC: 4.2 G/DL (ref 3.5–5.2)
ALBUMIN/GLOB SERPL: 1.8 G/DL
ALP SERPL-CCNC: 66 U/L (ref 39–117)
ALT SERPL W P-5'-P-CCNC: 10 U/L (ref 1–41)
ANION GAP SERPL CALCULATED.3IONS-SCNC: 12 MMOL/L (ref 5–15)
AST SERPL-CCNC: 21 U/L (ref 1–40)
BASOPHILS # BLD AUTO: 0.01 10*3/MM3 (ref 0–0.2)
BASOPHILS NFR BLD AUTO: 0.1 % (ref 0–1.5)
BILIRUB SERPL-MCNC: 0.6 MG/DL (ref 0–1.2)
BUN SERPL-MCNC: 16 MG/DL (ref 6–20)
BUN/CREAT SERPL: 21.1 (ref 7–25)
CALCIUM SPEC-SCNC: 9.3 MG/DL (ref 8.6–10.5)
CHLORIDE SERPL-SCNC: 104 MMOL/L (ref 98–107)
CO2 SERPL-SCNC: 22 MMOL/L (ref 22–29)
CREAT SERPL-MCNC: 0.76 MG/DL (ref 0.76–1.27)
DEPRECATED RDW RBC AUTO: 40.2 FL (ref 37–54)
EGFRCR SERPLBLD CKD-EPI 2021: 120.2 ML/MIN/1.73
EOSINOPHIL # BLD AUTO: 0 10*3/MM3 (ref 0–0.4)
EOSINOPHIL NFR BLD AUTO: 0 % (ref 0.3–6.2)
ERYTHROCYTE [DISTWIDTH] IN BLOOD BY AUTOMATED COUNT: 12.6 % (ref 12.3–15.4)
GLOBULIN UR ELPH-MCNC: 2.4 GM/DL
GLUCOSE SERPL-MCNC: 117 MG/DL (ref 65–99)
HCT VFR BLD AUTO: 35.3 % (ref 37.5–51)
HGB BLD-MCNC: 11.8 G/DL (ref 13–17.7)
IMM GRANULOCYTES # BLD AUTO: 0.04 10*3/MM3 (ref 0–0.05)
IMM GRANULOCYTES NFR BLD AUTO: 0.4 % (ref 0–0.5)
LYMPHOCYTES # BLD AUTO: 0.83 10*3/MM3 (ref 0.7–3.1)
LYMPHOCYTES NFR BLD AUTO: 8.3 % (ref 19.6–45.3)
MCH RBC QN AUTO: 29.4 PG (ref 26.6–33)
MCHC RBC AUTO-ENTMCNC: 33.4 G/DL (ref 31.5–35.7)
MCV RBC AUTO: 88 FL (ref 79–97)
MONOCYTES # BLD AUTO: 0.74 10*3/MM3 (ref 0.1–0.9)
MONOCYTES NFR BLD AUTO: 7.4 % (ref 5–12)
NEUTROPHILS NFR BLD AUTO: 8.36 10*3/MM3 (ref 1.7–7)
NEUTROPHILS NFR BLD AUTO: 83.8 % (ref 42.7–76)
NRBC BLD AUTO-RTO: 0 /100 WBC (ref 0–0.2)
PLATELET # BLD AUTO: 288 10*3/MM3 (ref 140–450)
PMV BLD AUTO: 10.7 FL (ref 6–12)
POTASSIUM SERPL-SCNC: 3.9 MMOL/L (ref 3.5–5.2)
PROT SERPL-MCNC: 6.6 G/DL (ref 6–8.5)
RBC # BLD AUTO: 4.01 10*6/MM3 (ref 4.14–5.8)
SODIUM SERPL-SCNC: 138 MMOL/L (ref 136–145)
WBC NRBC COR # BLD: 9.98 10*3/MM3 (ref 3.4–10.8)

## 2022-11-13 PROCEDURE — 99232 SBSQ HOSP IP/OBS MODERATE 35: CPT | Performed by: INTERNAL MEDICINE

## 2022-11-13 PROCEDURE — 99233 SBSQ HOSP IP/OBS HIGH 50: CPT | Performed by: SURGERY

## 2022-11-13 PROCEDURE — 25010000002 PIPERACILLIN SOD-TAZOBACTAM PER 1 G: Performed by: INTERNAL MEDICINE

## 2022-11-13 PROCEDURE — 0 IOPAMIDOL PER 1 ML: Performed by: INTERNAL MEDICINE

## 2022-11-13 PROCEDURE — 74177 CT ABD & PELVIS W/CONTRAST: CPT

## 2022-11-13 PROCEDURE — 94799 UNLISTED PULMONARY SVC/PX: CPT

## 2022-11-13 PROCEDURE — 80053 COMPREHEN METABOLIC PANEL: CPT | Performed by: INTERNAL MEDICINE

## 2022-11-13 PROCEDURE — 25010000002 HYDROMORPHONE 1 MG/ML SOLUTION: Performed by: INTERNAL MEDICINE

## 2022-11-13 PROCEDURE — 85025 COMPLETE CBC W/AUTO DIFF WBC: CPT | Performed by: INTERNAL MEDICINE

## 2022-11-13 PROCEDURE — 25010000002 METHYLPREDNISOLONE PER 40 MG: Performed by: INTERNAL MEDICINE

## 2022-11-13 RX ORDER — ACETAMINOPHEN 325 MG/1
650 TABLET ORAL EVERY 4 HOURS PRN
Status: DISCONTINUED | OUTPATIENT
Start: 2022-11-13 | End: 2022-11-15 | Stop reason: HOSPADM

## 2022-11-13 RX ADMIN — TAZOBACTAM SODIUM AND PIPERACILLIN SODIUM 3.38 G: 375; 3 INJECTION, SOLUTION INTRAVENOUS at 21:09

## 2022-11-13 RX ADMIN — HYDROMORPHONE HYDROCHLORIDE 0.5 MG: 1 INJECTION, SOLUTION INTRAMUSCULAR; INTRAVENOUS; SUBCUTANEOUS at 02:29

## 2022-11-13 RX ADMIN — TAZOBACTAM SODIUM AND PIPERACILLIN SODIUM 3.38 G: 375; 3 INJECTION, SOLUTION INTRAVENOUS at 15:09

## 2022-11-13 RX ADMIN — DEXTROSE AND SODIUM CHLORIDE 100 ML/HR: 5; 450 INJECTION, SOLUTION INTRAVENOUS at 17:39

## 2022-11-13 RX ADMIN — METHYLPREDNISOLONE SODIUM SUCCINATE 20 MG: 40 INJECTION, POWDER, FOR SOLUTION INTRAMUSCULAR; INTRAVENOUS at 07:43

## 2022-11-13 RX ADMIN — TAZOBACTAM SODIUM AND PIPERACILLIN SODIUM 3.38 G: 375; 3 INJECTION, SOLUTION INTRAVENOUS at 07:44

## 2022-11-13 RX ADMIN — Medication 10 ML: at 21:09

## 2022-11-13 RX ADMIN — METHYLPREDNISOLONE SODIUM SUCCINATE 20 MG: 40 INJECTION, POWDER, FOR SOLUTION INTRAMUSCULAR; INTRAVENOUS at 15:09

## 2022-11-13 RX ADMIN — IOPAMIDOL 100 ML: 755 INJECTION, SOLUTION INTRAVENOUS at 16:58

## 2022-11-13 RX ADMIN — METHYLPREDNISOLONE SODIUM SUCCINATE 20 MG: 40 INJECTION, POWDER, FOR SOLUTION INTRAMUSCULAR; INTRAVENOUS at 21:09

## 2022-11-13 NOTE — PROGRESS NOTES
LOS: 1 day   Patient Care Team:  Dahlia Del Castillo PA-C as PCP - General (Physician Assistant)      Subjective     Interval History:   Doing well, states he had some small bms and is passing flatus, hold on diet for now, no new issues    Objective     Vital Signs  Temp:  [97.7 °F (36.5 °C)-98.7 °F (37.1 °C)] 98.7 °F (37.1 °C)  Heart Rate:  [] 87  Resp:  [14-16] 14  BP: ()/(58-75) 117/68    Physical Exam:  NAD  Abd soft, nd, nt     Results Review:     I reviewed the patient's new clinical results.      Assessment & Plan       Small bowel obstruction (HCC)        Plan:  Dc ngt  Will start clears patsy if he does well today  Increase activity      Clears were started yesterday and tolerated well with multiple BMs and flatus per RN notes.... no need for surgical intervention so will sign off.  Agree with GI plan and no need for surgery outpatient followup since he will followup with GI.

## 2022-11-13 NOTE — PROGRESS NOTES
[facility]  Inpatient Progress Note    Feliberto Sanitllan  1985 11/13/2022    Subjective   Subjective     HPI:  Feliberto Santillan is a 37 y.o. male Feliberto Santillan is a 37 y.o. male who has a history of Crohn's disease.  Details of his disease are unclear.  He reports having been diagnosed when he was 19 or 20 years of age.  Last note from Dr. Dukes is noted in 2018.  He apparently had episode of small bowel obstruction in the past.  He presented with abdominal pain, nausea and vomiting.     In the emergency room a CT of the abdomen with IV contrast demonstrated suspected mechanical small bowel obstruction with a transition zone in the right lower quadrant probably involving the distal ileum.  No evidence of any fistulous disease.     CT of the abdomen on 4/10/2022 showed mild nonspecific diffuse colonic wall thickening  Laboratory data showed a normal CHEM profile.  CBC showed leukocytosis with white count of 14.38.  Hemoglobin is normal.  CRP is 0.94.  Hemoglobin dropped to 11.8 today compared to 14.2 on admission.  Normal white count today.    NG tube has been removed today.  He reports no nausea or vomiting.  Stated that he had a small liquid bowel movement    Review of Systems     All systems were reviewed and negative except for: Weight loss and fatigue.  GI complaints are as described.    Objective    Objective     Current Medications  Scheduled Meds:methylPREDNISolone sodium succinate, 20 mg, Intravenous, Q8H  piperacillin-tazobactam, 3.375 g, Intravenous, Q8H  sodium chloride, 1,000 mL, Intravenous, Once  sodium chloride, 10 mL, Intravenous, Q12H      Continuous Infusions:dextrose 5 % and sodium chloride 0.45 %, 100 mL/hr, Last Rate: 100 mL/hr (11/12/22 1252)  Pharmacy to Dose Zosyn,       PRN Meds:.•  acetaminophen  •  albuterol  •  HYDROmorphone  •  metoprolol tartrate  •  nitroglycerin  •  Pharmacy to Dose Zosyn  •  sodium chloride  •  [COMPLETED] Insert peripheral IV  **AND** sodium chloride  •  sodium chloride  •  sodium chloride     Vitals:  Temp:  [97.7 °F (36.5 °C)-98.7 °F (37.1 °C)] 97.9 °F (36.6 °C)  Heart Rate:  [] 84  Resp:  [14-16] 14  BP: ()/(58-69) 99/58    Physical Exam:   Constitutional: Awake, alert   Eyes: PERRLA, sclerae anicteric, no conjunctival injection   HENT: NCAT, mucous membranes moist   Neck: Supple, no thyromegaly, no lymphadenopathy, trachea midline   Respiratory: Clear to auscultation bilaterally, nonlabored respirations    Cardiovascular: RRR, no murmurs, rubs, or gallops, palpable pedal pulses bilaterally   Gastrointestinal: Positive bowel sounds, soft, nontender, nondistended   Musculoskeletal: No bilateral ankle edema, no clubbing or cyanosis to extremities   Psychiatric: Appropriate affect, cooperative   Neurologic: Oriented x 3, strength symmetric in all extremities, Cranial Nerves grossly intact to confrontation, speech clear   Skin: No rashes        Results Review:    I have reviewed all of the patients current test results    Laboratory:    Lab Results (last 24 hours)     Procedure Component Value Units Date/Time    Comprehensive Metabolic Panel [[id number]]  (Abnormal) Collected: 11/13/22 0445    Specimen: Blood Updated: 11/13/22 0544     Glucose 117 mg/dL      BUN 16 mg/dL      Creatinine 0.76 mg/dL      Sodium 138 mmol/L      Potassium 3.9 mmol/L      Chloride 104 mmol/L      CO2 22.0 mmol/L      Calcium 9.3 mg/dL      Total Protein 6.6 g/dL      Albumin 4.20 g/dL      ALT (SGPT) 10 U/L      AST (SGOT) 21 U/L      Alkaline Phosphatase 66 U/L      Total Bilirubin 0.6 mg/dL      Globulin 2.4 gm/dL      A/G Ratio 1.8 g/dL      BUN/Creatinine Ratio 21.1     Anion Gap 12.0 mmol/L      eGFR 120.2 mL/min/1.73      Comment: National Kidney Foundation and American Society of Nephrology (ASN) Task Force recommended calculation based on the Chronic Kidney Disease Epidemiology Collaboration (CKD-EPI) equation refit without adjustment for  race.       Narrative:      GFR Normal >60  Chronic Kidney Disease <60  Kidney Failure <15      CBC Auto Differential [[id number]]  (Abnormal) Collected: 11/13/22 0445    Specimen: Blood Updated: 11/13/22 0536     WBC 9.98 10*3/mm3      RBC 4.01 10*6/mm3      Hemoglobin 11.8 g/dL      Hematocrit 35.3 %      MCV 88.0 fL      MCH 29.4 pg      MCHC 33.4 g/dL      RDW 12.6 %      RDW-SD 40.2 fl      MPV 10.7 fL      Platelets 288 10*3/mm3      Neutrophil % 83.8 %      Lymphocyte % 8.3 %      Monocyte % 7.4 %      Eosinophil % 0.0 %      Basophil % 0.1 %      Immature Grans % 0.4 %      Neutrophils, Absolute 8.36 10*3/mm3      Lymphocytes, Absolute 0.83 10*3/mm3      Monocytes, Absolute 0.74 10*3/mm3      Eosinophils, Absolute 0.00 10*3/mm3      Basophils, Absolute 0.01 10*3/mm3      Immature Grans, Absolute 0.04 10*3/mm3      nRBC 0.0 /100 WBC     COVID-19,APTIMA PANTHER(DANIKA),[facility] MARYCHUY, NP/OP SWAB IN UTM/VTM/SALINE TRANSPORT MEDIA,24 HR TAT - Swab, Nasal Cavity [[id number]]  (Normal) Collected: 11/12/22 0948    Specimen: Swab from Nasal Cavity Updated: 11/12/22 1414     COVID19 Not Detected    Narrative:      Fact sheet for providers: https://www.fda.gov/media/057285/download     Fact sheet for patients: https://www.fda.gov/media/937075/download    Test performed by RT PCR.    C-reactive Protein [[id number]]  (Abnormal) Collected: 11/12/22 1321    Specimen: Blood from Arm, Right Updated: 11/12/22 1413     C-Reactive Protein 0.94 mg/dL            Radiology:    Imaging Results (Last 24 Hours)     ** No results found for the last 24 hours. **            Assessment & Plan     Patient Active Problem List   Diagnosis Code   • Anxiety and depression F41.9, F32.A   • Asthma J45.909   • Ulcerative colitis (HCC) K51.90   • POTS (postural orthostatic tachycardia syndrome) G90.A   • NELSON (dyspnea on exertion) R06.09   • Tachycardia R00.0   • Small bowel obstruction (HCC) K56.609     Assessment    #1 small bowel  obstruction    Patient appears to have had at least a partial small bowel obstruction that seems to have improved with conservative measures.  NG tube has been removed.  Most likely from ileocolonic Crohn's.  Details of his IBD disease is unclear.  Previous history of diffuse colonic inflammation on a prior CT scan but also carries a diagnosis of Crohn's.  He most likely has ileocolonic Crohn's.  Prior colonoscopy report also is not available.    2.  Inflammatory bowel disease    Most likely ileocolonic Crohn's.  Details of prior work-up unavailable.    Recommendations    1.  NG tube has been removed.  We will start clear liquid diet.    2.  Continue Solu-Medrol 20 mg IV every 8    3.  We will check a CT enterography in AM.    4.  Eventual colonoscopy and consideration for biologic therapy.      Roel Saunders MD  11/13/22  12:43 EST      Electronically signed by Roel Saunders MD, 11/13/22, 12:43 PM EST.

## 2022-11-13 NOTE — PLAN OF CARE
Goal Outcome Evaluation:      NG tube removed, no complaints of nausea. Passing gas and having multiple bowel movements. Advanced to clear liquids and tolerating well. VSS.

## 2022-11-13 NOTE — PROGRESS NOTES
[facility]     Progress Note    Patient Name: Feliberto Santillan  : 1985  MRN: [id number]  Primary Care Physician:  Dahlia Del Castillo PA-C  Date of admission: 2022    Subjective   Subjective     Chief Complaint: Crohn's disease, small bowel obstruction resolved    HPI:  Patient Reports he had a bowel movement today small amount and passed gas    Review of Systems   Review of Systems   Constitutional: Positive for fatigue. Negative for fever. Activity change: in bed.   HENT: Negative.    Eyes: Negative.    Respiratory: Negative.    Cardiovascular: Negative.    Gastrointestinal: Abdominal pain: better. Constipation: better.   Endocrine: Negative.    Genitourinary: Negative.    Musculoskeletal: Negative.    Skin: Negative.    Allergic/Immunologic: Negative.    Neurological: Negative.    Hematological: Negative.    Psychiatric/Behavioral: Negative.        Objective   Objective     Vitals:   Temp:  [97.7 °F (36.5 °C)-98.7 °F (37.1 °C)] 97.9 °F (36.6 °C)  Heart Rate:  [] 84  Resp:  [14-16] 14  BP: ()/(58-69) 99/58  Physical Exam    Constitutional: Awake, alert   Eyes: PERRLA, sclerae anicteric, no conjunctival injection   HENT: NCAT, mucous membranes moist   Neck: Supple, no thyromegaly, no lymphadenopathy, trachea midline   Respiratory: Clear to auscultation bilaterally, nonlabored respirations    Cardiovascular: RRR, no murmurs, rubs, or gallops, palpable pedal pulses bilaterally   Gastrointestinal: Positive bowel sounds, soft, nontender, nondistended   Musculoskeletal: No bilateral ankle edema, no clubbing or cyanosis to extremities   Psychiatric: Appropriate affect, cooperative   Neurologic: Oriented x 3, strength symmetric in all extremities, Cranial Nerves grossly intact to confrontation, speech clear   Skin: No rashes     Result Review    Result Review:  I have personally reviewed the results from the time of this admission to 2022 12:12 EST and agree with these findings:  [x]   Laboratory  []  Microbiology  []  Radiology  []  EKG/Telemetry   []  Cardiology/Vascular   []  Pathology  []  Old records  []  Other:  Most notable findings include: NG tube has been removed and IV Solu-Medrol, passed bowel movement  Assessment & Plan   Assessment / Plan     Brief Patient Summary:  Feliberto Santillan is a 37 y.o. male who has Crohn's disease and not on any specific treatment    Active Hospital Problems:  Active Hospital Problems    Diagnosis    • **Small bowel obstruction (HCC)      Plan:   Being followed by GI and is on IV Solu-Medrol  Seen by surgery and recommends bowel rest today and clears in a.m.  GI plans CT enterography in a.m.  GI recommends colonoscopy and consideration for biologic therapy    DVT prophylaxis:  Mechanical DVT prophylaxis orders are present.    CODE STATUS:   Level Of Support Discussed With: Patient  Code Status (Patient has no pulse and is not breathing): CPR (Attempt to Resuscitate)  Medical Interventions (Patient has pulse or is breathing): Full Support      Electronically signed by Yasmani Garcia MD, 11/13/22, 12:12 PM EST.

## 2022-11-14 PROBLEM — I95.9 HYPOTENSION: Status: ACTIVE | Noted: 2022-11-14

## 2022-11-14 PROBLEM — K50.90 INFLAMMATORY BOWEL DISEASE (CROHN'S DISEASE): Chronic | Status: ACTIVE | Noted: 2022-11-14

## 2022-11-14 LAB
BILIRUB UR QL STRIP: NEGATIVE
CLARITY UR: CLEAR
COLOR UR: YELLOW
GLUCOSE UR STRIP-MCNC: NEGATIVE MG/DL
HGB UR QL STRIP.AUTO: NEGATIVE
KETONES UR QL STRIP: NEGATIVE
LEUKOCYTE ESTERASE UR QL STRIP.AUTO: NEGATIVE
NITRITE UR QL STRIP: NEGATIVE
PH UR STRIP.AUTO: 6.5 [PH] (ref 5–8)
PROT UR QL STRIP: NEGATIVE
SP GR UR STRIP: <=1.005 (ref 1–1.03)
UROBILINOGEN UR QL STRIP: NORMAL

## 2022-11-14 PROCEDURE — 25010000002 PIPERACILLIN SOD-TAZOBACTAM PER 1 G: Performed by: INTERNAL MEDICINE

## 2022-11-14 PROCEDURE — 25010000002 METHYLPREDNISOLONE PER 40 MG: Performed by: INTERNAL MEDICINE

## 2022-11-14 PROCEDURE — 99232 SBSQ HOSP IP/OBS MODERATE 35: CPT | Performed by: INTERNAL MEDICINE

## 2022-11-14 PROCEDURE — 25010000002 SODIUM CHLORIDE 0.9 % WITH KCL 20 MEQ 20-0.9 MEQ/L-% SOLUTION: Performed by: INTERNAL MEDICINE

## 2022-11-14 PROCEDURE — 94799 UNLISTED PULMONARY SVC/PX: CPT

## 2022-11-14 PROCEDURE — 81003 URINALYSIS AUTO W/O SCOPE: CPT | Performed by: EMERGENCY MEDICINE

## 2022-11-14 RX ORDER — SODIUM CHLORIDE AND POTASSIUM CHLORIDE 150; 900 MG/100ML; MG/100ML
100 INJECTION, SOLUTION INTRAVENOUS CONTINUOUS
Status: DISCONTINUED | OUTPATIENT
Start: 2022-11-14 | End: 2022-11-15 | Stop reason: HOSPADM

## 2022-11-14 RX ADMIN — Medication 10 ML: at 08:59

## 2022-11-14 RX ADMIN — SODIUM CHLORIDE 500 ML: 9 INJECTION, SOLUTION INTRAVENOUS at 11:46

## 2022-11-14 RX ADMIN — TAZOBACTAM SODIUM AND PIPERACILLIN SODIUM 3.38 G: 375; 3 INJECTION, SOLUTION INTRAVENOUS at 05:50

## 2022-11-14 RX ADMIN — Medication 10 ML: at 21:16

## 2022-11-14 RX ADMIN — TAZOBACTAM SODIUM AND PIPERACILLIN SODIUM 3.38 G: 375; 3 INJECTION, SOLUTION INTRAVENOUS at 21:16

## 2022-11-14 RX ADMIN — METHYLPREDNISOLONE SODIUM SUCCINATE 20 MG: 40 INJECTION, POWDER, FOR SOLUTION INTRAMUSCULAR; INTRAVENOUS at 21:16

## 2022-11-14 RX ADMIN — POTASSIUM CHLORIDE AND SODIUM CHLORIDE 100 ML/HR: 900; 150 INJECTION, SOLUTION INTRAVENOUS at 15:22

## 2022-11-14 RX ADMIN — METHYLPREDNISOLONE SODIUM SUCCINATE 20 MG: 40 INJECTION, POWDER, FOR SOLUTION INTRAMUSCULAR; INTRAVENOUS at 13:36

## 2022-11-14 RX ADMIN — METHYLPREDNISOLONE SODIUM SUCCINATE 20 MG: 40 INJECTION, POWDER, FOR SOLUTION INTRAMUSCULAR; INTRAVENOUS at 05:50

## 2022-11-14 RX ADMIN — TAZOBACTAM SODIUM AND PIPERACILLIN SODIUM 3.38 G: 375; 3 INJECTION, SOLUTION INTRAVENOUS at 13:35

## 2022-11-14 NOTE — PROGRESS NOTES
[facility]     Progress Note    Patient Name: Feliberto Santillan  : 1985  MRN: [id number]  Primary Care Physician:  Dahlia Del Castillo PA-C  Date of admission: 2022      Subjective   Brief summary.  Chart reviewed patient admitted with abdominal pain and Crohn's disease and small bowel obstruction      HPI:  Feeling better, having bowel movement.  No chest pain or shortness of breath minimal abdominal pain.  Blood pressure was running low, given fluid blood pressure better.  No dizziness or lightheadedness.  Patient reports he has not seen GI for 3 years for his GI problems    Review of Systems     No fever chills  Some abdominal pain  No nausea vomiting   Denies any chest pain  No dizziness          Objective     Vitals:   Temp:  [97.2 °F (36.2 °C)-98.8 °F (37.1 °C)] 97.9 °F (36.6 °C)  Heart Rate:  [68-91] 80  Resp:  [14-18] 16  BP: ()/(54-62) 101/57    Physical Exam :     Young male not in acute distress  No pallor  Heart regular  Lungs clear  Abdomen soft nontender  Extremities no edema      Result Review:  I have personally reviewed the results from the time of this admission to 2022 16:10 EST and agree with these findings:  [x]  Laboratory  []  Microbiology  []  Radiology  []  EKG/Telemetry   []  Cardiology/Vascular   []  Pathology  []  Old records  []  Other:           Assessment / Plan       Active Hospital Problems:  Active Hospital Problems    Diagnosis    • **Small bowel obstruction (HCC)    • Inflammatory bowel disease (Crohn's disease) (HCC)    • Hypotension        Plan:   Stable, blood pressure slightly low given fluids improving.  Patient has a history of POTS according to the chart.  Patient does not know much does not know much about his history very slow to respond to questions.  Given fluids his symptoms have improved.    Patient noncompliant has not been seen by physicians for several years.  GI consulted waiting for input from Dr. Briggs.  May need to scope.  Continue  fluids and antibiotics.  Low-dose steroids.  Check labs in a.m.    DVT prophylaxis:  Mechanical DVT prophylaxis orders are present.    CODE STATUS:   Level Of Support Discussed With: Patient  Code Status (Patient has no pulse and is not breathing): CPR (Attempt to Resuscitate)  Medical Interventions (Patient has pulse or is breathing): Full Support            Electronically signed by Luan Montana MD, 11/14/22, 4:07 PM EST.

## 2022-11-14 NOTE — PLAN OF CARE
Goal Outcome Evaluation:         PT has not had a BM today. BP at 89/54 this morning. MD notified, see orders. PT BP came up, still soft, no reports of dizziness/light headiness. Pt up in room independently. PT had no complains of nausea or pain. Trice Wright RN.

## 2022-11-15 ENCOUNTER — READMISSION MANAGEMENT (OUTPATIENT)
Dept: CALL CENTER | Facility: HOSPITAL | Age: 37
End: 2022-11-15

## 2022-11-15 ENCOUNTER — TELEPHONE (OUTPATIENT)
Dept: GASTROENTEROLOGY | Facility: CLINIC | Age: 37
End: 2022-11-15

## 2022-11-15 VITALS
HEIGHT: 69 IN | BODY MASS INDEX: 18.51 KG/M2 | RESPIRATION RATE: 16 BRPM | OXYGEN SATURATION: 100 % | WEIGHT: 125 LBS | TEMPERATURE: 97.2 F | SYSTOLIC BLOOD PRESSURE: 115 MMHG | DIASTOLIC BLOOD PRESSURE: 72 MMHG | HEART RATE: 63 BPM

## 2022-11-15 PROBLEM — K56.609 SMALL BOWEL OBSTRUCTION (HCC): Status: RESOLVED | Noted: 2022-11-12 | Resolved: 2022-11-15

## 2022-11-15 PROBLEM — I95.9 HYPOTENSION: Status: RESOLVED | Noted: 2022-11-14 | Resolved: 2022-11-15

## 2022-11-15 PROCEDURE — 99232 SBSQ HOSP IP/OBS MODERATE 35: CPT | Performed by: INTERNAL MEDICINE

## 2022-11-15 PROCEDURE — 25010000002 METHYLPREDNISOLONE PER 40 MG: Performed by: INTERNAL MEDICINE

## 2022-11-15 PROCEDURE — 25010000002 PIPERACILLIN SOD-TAZOBACTAM PER 1 G: Performed by: INTERNAL MEDICINE

## 2022-11-15 PROCEDURE — 94799 UNLISTED PULMONARY SVC/PX: CPT

## 2022-11-15 PROCEDURE — 25010000002 SODIUM CHLORIDE 0.9 % WITH KCL 20 MEQ 20-0.9 MEQ/L-% SOLUTION: Performed by: INTERNAL MEDICINE

## 2022-11-15 RX ORDER — ONDANSETRON 4 MG/1
4 TABLET, ORALLY DISINTEGRATING ORAL EVERY 8 HOURS PRN
Qty: 12 TABLET | Refills: 0 | Status: SHIPPED | OUTPATIENT
Start: 2022-11-15 | End: 2022-11-18

## 2022-11-15 RX ORDER — PREDNISONE 10 MG/1
TABLET ORAL
Qty: 67 TABLET | Refills: 0 | Status: SHIPPED | OUTPATIENT
Start: 2022-11-15 | End: 2022-12-16

## 2022-11-15 RX ORDER — FAMOTIDINE 20 MG/1
20 TABLET, FILM COATED ORAL 2 TIMES DAILY
Qty: 60 TABLET | Refills: 0 | Status: SHIPPED | OUTPATIENT
Start: 2022-11-15 | End: 2022-12-15

## 2022-11-15 RX ADMIN — POTASSIUM CHLORIDE AND SODIUM CHLORIDE 100 ML/HR: 900; 150 INJECTION, SOLUTION INTRAVENOUS at 01:32

## 2022-11-15 RX ADMIN — TAZOBACTAM SODIUM AND PIPERACILLIN SODIUM 3.38 G: 375; 3 INJECTION, SOLUTION INTRAVENOUS at 06:13

## 2022-11-15 RX ADMIN — TAZOBACTAM SODIUM AND PIPERACILLIN SODIUM 3.38 G: 375; 3 INJECTION, SOLUTION INTRAVENOUS at 15:11

## 2022-11-15 RX ADMIN — METHYLPREDNISOLONE SODIUM SUCCINATE 20 MG: 40 INJECTION, POWDER, FOR SOLUTION INTRAMUSCULAR; INTRAVENOUS at 06:13

## 2022-11-15 RX ADMIN — METHYLPREDNISOLONE SODIUM SUCCINATE 20 MG: 40 INJECTION, POWDER, FOR SOLUTION INTRAMUSCULAR; INTRAVENOUS at 15:10

## 2022-11-15 NOTE — CONSULTS
"Nutrition Services    Patient Name: Feliberto Santillan  YOB: 1985  MRN: [id number]  Admission date: 11/12/2022      CLINICAL NUTRITION ASSESSMENT      Reason for Assessment  Follow-up protocol     H&P:    Past Medical History:   Diagnosis Date   • Abnormal ECG    • Anxiety    • Arrhythmia    • Asthma    • Hypertension    • POTS (postural orthostatic tachycardia syndrome)    • Tachycardia         Current Problems:   Active Hospital Problems    Diagnosis    • **Small bowel obstruction (HCC)    • Inflammatory bowel disease (Crohn's disease) (HCC)    • Hypotension         Nutrition/Diet History         Narrative     Per GI, pt's SBO has improved w/conservative measures and plans to advance diet as tolerated. NGT pulled 11/13, pt tolerating CLD w/PO intake of 75% and w/small, loose BMs. Pt also receiving D5+1/2 NS @ 100 mL/hr to provide 2400 mL fluid, 120 g dextrose, and 408 kcal/d.      Anthropometrics        Current Height, Weight Height: 175.3 cm (69\")  Weight: 56.7 kg (125 lb)   Current BMI Body mass index is 18.46 kg/m².       Weight Hx  Wt Readings from Last 30 Encounters:   11/15/22 0513 56.7 kg (125 lb)   11/14/22 0600 55.7 kg (122 lb 12.7 oz)   11/12/22 0109 57 kg (125 lb 10.6 oz)   04/09/22 2037 55.6 kg (122 lb 9.2 oz)   11/23/21 0624 56 kg (123 lb 7.3 oz)   11/17/21 1345 56.2 kg (124 lb)   06/25/21 2232 57.6 kg (126 lb 15.8 oz)   09/02/18 1152 63.5 kg (140 lb)   08/06/18 0000 60 kg (132 lb 4 oz)   03/06/18 0000 61.3 kg (135 lb 4 oz)            Wt Change Observation Stable x 1.5 years     Estimated/Assessed Needs       Energy Requirements 30-35 kcal/kg   EST Needs (kcal/day) 4790-7074 kcal        Protein Requirements 1.2-1.5 g/kg    EST Daily Needs (g/day) 68-86 g        Fluid Requirements 30 ml/kg    Estimated Needs (mL/day) 1710     Labs/Medications         Pertinent Labs Reviewed.   Results from last 7 days   Lab Units 11/13/22  0445 11/12/22  0116   SODIUM mmol/L 138 139   POTASSIUM mmol/L " 3.9 4.1   CHLORIDE mmol/L 104 100   CO2 mmol/L 22.0 24.7   BUN mg/dL 16 17   CREATININE mg/dL 0.76 1.02   CALCIUM mg/dL 9.3 10.2   BILIRUBIN mg/dL 0.6 0.5   ALK PHOS U/L 66 81   ALT (SGPT) U/L 10 12   AST (SGOT) U/L 21 23   GLUCOSE mg/dL 117* 90     Results from last 7 days   Lab Units 11/13/22  0445   HEMOGLOBIN g/dL 11.8*   HEMATOCRIT % 35.3*     COVID19   Date Value Ref Range Status   11/12/2022 Not Detected Not Detected - Ref. Range Final     No results found for: HGBA1C      Pertinent Medications Reviewed.     Current Nutrition Orders & Evaluation of Intake       Oral Nutrition     Current PO Diet Diet Regular Texture (IDDSI 7); Regular Consistency; Liquid Diets; Full Liquid   Supplement No active supplement orders       Malnutrition Severity Assessment                Nutrition Diagnosis         Nutrition Dx Problem 1 Inadequate energy Intake related to Inability to consume sufficient energy as evidenced by NPO and clear liquid diet.       Nutrition Intervention         *Advance diet as medically appropriate      Medical Nutrition Therapy/Nutrition Education          Learner     Readiness N/A  N/A     Method     Response N/A  N/A     Monitor/Evaluation        Monitor Per protocol, I&O, Pertinent labs, Weight, GI status, Diet advancement       Nutrition Discharge Plan         To be determined       Electronically signed by:  Radha Robertson RD  11/15/22 12:55 EST

## 2022-11-15 NOTE — PROGRESS NOTES
LOS: 2 days   Patient Care Team:  Dahlia Del Castillo PA-C as PCP - General (Physician Assistant)    Chief Complaint:  Crohn’s disease  sbo     Subjective     Interval History:     Patient Complaints: abdominal pain  Patient Denies:  nausea and vomiting   History taken from: patient    Review of Systems:    All systems were reviewed and negative except for:  Gastrointestinal: positive for  constipation    Objective     Vital Signs  Temp:  [97.2 °F (36.2 °C)-98.8 °F (37.1 °C)] 97.9 °F (36.6 °C)  Heart Rate:  [68-86] 80  Resp:  [14-18] 16  BP: ()/(54-62) 101/57      Physical Exam:       General Appearance:    Alert, cooperative, in no acute distress   Head:    Normocephalic, without obvious abnormality, atraumatic   Eyes:            Lids and lashes normal, conjunctivae and sclerae normal, no   icterus, no pallor, corneas clear, PERRLA   Ears:    Ears appear intact with no abnormalities noted   Throat:   No oral lesions, no thrush, oral mucosa moist   Neck:   No adenopathy, supple, trachea midline, no thyromegaly, no   carotid bruit, no JVD   Back:     No kyphosis present, no scoliosis present, no skin lesions,      erythema or scars, no tenderness to percussion or                   palpation,   range of motion normal   Lungs:     Clear to auscultation,respirations regular, even and                  unlabored    Heart:    Regular rhythm and normal rate, normal S1 and S2, no            murmur, no gallop, no rub, no click   Chest Wall:    No abnormalities observed   Abdomen:     Normal bowel sounds, no masses, no organomegaly, soft        non-tender, non-distended, no guarding, no rebound                tenderness   Rectal:     Deferred   Extremities:   Moves all extremities well, no edema, no cyanosis, no             redness   Pulses:   Pulses palpable and equal bilaterally   Skin:   No bleeding, bruising or rash   Lymph nodes:   No palpable adenopathy   Neurologic:   Cranial nerves 2 - 12 grossly intact,  sensation intact, DTR       present and equal bilaterally        Results Review:     I reviewed the patient's new clinical results.    Medication Review: no changes    Assessment & Plan       Small bowel obstruction (HCC)    Inflammatory bowel disease (Crohn's disease) (HCC)    Hypotension    cont current is steroids  advance to full liquids as martha    Plan for disposition:unknown    Abhinav Briggs MD  11/14/22  20:46 EST      Time: 10

## 2022-11-15 NOTE — PROGRESS NOTES
[facility]  Inpatient Progress Note    Reason for Follow Up:  SBO    Subjective     Interval History:   Pt tolerating liquids.  Reports loose bowel movements.  No significant abd pain.    Current Facility-Administered Medications:   •  acetaminophen (TYLENOL) tablet 650 mg, 650 mg, Oral, Q4H PRN, Yasmani Garcia MD  •  albuterol (PROVENTIL) nebulizer solution 0.083% 2.5 mg/3mL, 2.5 mg, Nebulization, Q6H PRN, Yasmani Garcia MD  •  dextrose 5 % and sodium chloride 0.45 % infusion, 100 mL/hr, Intravenous, Continuous, Yasmani Garcia MD, Stopped at 11/14/22 1523  •  HYDROmorphone (DILAUDID) injection 0.5 mg, 0.5 mg, Intravenous, Q4H PRN, Yasmani Garcia MD, 0.5 mg at 11/13/22 0229  •  methylPREDNISolone sodium succinate (SOLU-Medrol) injection 20 mg, 20 mg, Intravenous, Q8H, Roel Saunders MD, 20 mg at 11/15/22 0613  •  metoprolol tartrate (LOPRESSOR) injection 5 mg, 5 mg, Intravenous, Q4H PRN, Yasmani Garcia MD  •  nitroglycerin (NITROSTAT) SL tablet 0.4 mg, 0.4 mg, Sublingual, Q5 Min PRN, Yasmani Garcia MD  •  Pharmacy to Dose Zosyn, , Does not apply, Continuous PRN, Yasmani Garcia MD  •  piperacillin-tazobactam (ZOSYN) 3.375 g in iso-osmotic dextrose 50 ml (premix), 3.375 g, Intravenous, Q8H, Yasmani Garcia MD, 3.375 g at 11/15/22 0613  •  sodium chloride 0.9 % bolus 1,000 mL, 1,000 mL, Intravenous, Once, Aristides Vasquez, DO  •  sodium chloride 0.9 % flush 10 mL, 10 mL, Intravenous, PRN, Aristides Vasquez DO  •  [COMPLETED] Insert peripheral IV, , , Once **AND** sodium chloride 0.9 % flush 10 mL, 10 mL, Intravenous, PRN, Dishaw, Aristides, DO  •  sodium chloride 0.9 % flush 10 mL, 10 mL, Intravenous, PRN, Yasmani Garcia MD  •  sodium chloride 0.9 % flush 10 mL, 10 mL, Intravenous, Q12H, Yasmani Garcia MD, 10 mL at 11/14/22 2116  •  sodium chloride 0.9 % infusion 40 mL, 40 mL, Intravenous, PRN, Yasmain Garcia MD  •  sodium chloride 0.9 % with KCl 20 mEq/L infusion, 100 mL/hr,  Intravenous, Continuous, Luan Montana MD, Last Rate: 100 mL/hr at 11/15/22 0132, 100 mL/hr at 11/15/22 0132  Review of Systems:    The following systems were reviewed and negative;  constitution, respiratory and cardiovascular    Objective     Vital Signs  Temp:  [97.3 °F (36.3 °C)-98 °F (36.7 °C)] 97.3 °F (36.3 °C)  Heart Rate:  [57-80] 62  Resp:  [16] 16  BP: ()/(57-70) 101/61  Body mass index is 18.46 kg/m².  No intake or output data in the 24 hours ending 11/15/22 1229  No intake/output data recorded.     Physical Exam:   General: awake, alert and in no acute distress   Eyes: eyes move symmetrical in all directions, no scleral icterus   Neck: supple, trachea is midline   Skin: warm and dry, not jaundiced   Cardiovascular: no chest tenderness   Pulm: breathing unlabored   Abdomen: soft, nontender, nondistended   Rectal: deferred   Extremities: no rash or edema   Psychiatric: mental status within normal limits, alert and oriented      Results Review:     I reviewed the patient's new clinical results.    Results from last 7 days   Lab Units 11/13/22  0445 11/12/22  0116   WBC 10*3/mm3 9.98 14.36*   HEMOGLOBIN g/dL 11.8* 14.2   HEMATOCRIT % 35.3* 42.4   PLATELETS 10*3/mm3 288 327     Results from last 7 days   Lab Units 11/13/22  0445 11/12/22  0116   SODIUM mmol/L 138 139   POTASSIUM mmol/L 3.9 4.1   CHLORIDE mmol/L 104 100   CO2 mmol/L 22.0 24.7   BUN mg/dL 16 17   CREATININE mg/dL 0.76 1.02   CALCIUM mg/dL 9.3 10.2   BILIRUBIN mg/dL 0.6 0.5   ALK PHOS U/L 66 81   ALT (SGPT) U/L 10 12   AST (SGOT) U/L 21 23   GLUCOSE mg/dL 117* 90         Lab Results   Lab Value Date/Time    LIPASE 23 11/12/2022 0116    LIPASE 32 04/09/2022 2044    LIPASE 50 09/09/2020 2348    LIPASE 52 (H) 09/09/2020 1610       Radiology:  [unfilled]      Assessment & Plan   Assessment:     SBO  Small bowel Crohn's disease  Medical non-compliance    Plan:     - continue IV solumedrol as inpatient  - would transition to prednisone 40 mg  daily at discharge and taper by 10 mg every week until reach 20 mg daily, then taper by 5 mg per week, then stop  - will order lab workup for possible initiation of biologic/immunomodulator therapy as outpatient  - continue to advance diet as tolerated    I discussed the patients findings and my recommendations with patient.         Alisha Dukes M.D.  [place] [address] [address]  [place]  [id number]  Office: [phone]

## 2022-11-15 NOTE — DISCHARGE SUMMARY
[facility]         DISCHARGE SUMMARY    Patient Name: Feliberto Santillan  : 1985  MRN: [id number]    Date of Admission: 2022  Date of Discharge: 11/15/2022  Primary Care Physician: Dahlia Del Castillo PA-C    Consults     Date and Time Order Name Status Description    2022  9:36 AM Inpatient Gastroenterology Consult      2022  9:36 AM Inpatient General Surgery Consult Completed     2022  7:12 AM General MD Inpatient Consult      2022  7:10 AM General MD Inpatient Consult      2022  7:08 AM General MD Inpatient Consult            Presenting Problem:   Small bowel obstruction (HCC) [K56.609]  History of Crohn's disease [Z87.19]    Active and Resolved Hospital Problems:  Active Hospital Problems    Diagnosis POA   • Inflammatory bowel disease (Crohn's disease) (HCC) [K50.90] Yes      Resolved Hospital Problems    Diagnosis POA   • **Small bowel obstruction (HCC) [K56.609] Yes   • Hypotension [I95.9] No         Hospital Course     Hospital Course:  Feliberto Santillan is a 37 y.o. male with known history of inflammatory bowel disease, reported to ER with abdominal pain, work-up showed small bowel obstruction partial, also appeared to have exacerbation of Crohn's disease.  Patient was started on IV steroids and antibiotics and general surgery consult was placed.    Patient was seen by general surgeon on-call, no surgical intervention was recommended.  Conservative management was done.  Patient improved with these measures had a BM.  Patient also was seen by GI.  Gastroenterologist saw patient and followed up until today followed with recommendations to continue steroids with tapering and to follow-up with them as an outpatient.  Patient is feeling much better now.  He had mild hypotension yesterday which resolved.  He does not have any chest pain, shortness of breath or abdominal pain no blood in the stools.  As he is feeling better he will be discharged to home  with outpatient follow-up.        DISCHARGE Follow Up Recommendations for labs and diagnostics:   Patient stable ready for discharge.  Advised to take medications as prescribed on discharge.  Recommended follow-up with consultants as advised.  Patient advised to return to ER if symptoms get worse.  Patient advised to follow-up with me/PCP post discharge in 1 week.    Noted the time of discharge patient reported that he is taking atenolol at home although he told me he has not seen any physician for 3 years and he does not follow-up with any doctor.  Its not clear whether he was on a atenolol or not.  His beta-blocker was held because of his hypotension.  At the time of discharge instructions also I gave him instruction not to take atenolol but after the nurse call me that he was taking it I advised him to continue and follow-up with PCP as soon as possible.      Day of Discharge     Vital Signs:  Temp:  [97.2 °F (36.2 °C)-98 °F (36.7 °C)] 97.2 °F (36.2 °C)  Heart Rate:  [57-71] 63  Resp:  [16] 16  BP: ()/(55-72) 115/72    Physical Exam:    Young male not in acute distress, tired looking  No icterus or pallor  Heart regular lungs clear    Abdomen soft nontender  Extremities no edema        Pertinent  and/or Most Recent Results     LAB RESULTS:      Lab 11/13/22 0445 11/12/22  1321 11/12/22  0116   WBC 9.98  --  14.36*   HEMOGLOBIN 11.8*  --  14.2   HEMATOCRIT 35.3*  --  42.4   PLATELETS 288  --  327   NEUTROS ABS 8.36*  --  12.15*   IMMATURE GRANS (ABS) 0.04  --  0.04   LYMPHS ABS 0.83  --  0.90   MONOS ABS 0.74  --  0.84   EOS ABS 0.00  --  0.35   MCV 88.0  --  87.8   CRP  --  0.94*  --          Lab 11/13/22 0445 11/12/22  0116   SODIUM 138 139   POTASSIUM 3.9 4.1   CHLORIDE 104 100   CO2 22.0 24.7   ANION GAP 12.0 14.3   BUN 16 17   CREATININE 0.76 1.02   EGFR 120.2 98.3   GLUCOSE 117* 90   CALCIUM 9.3 10.2         Lab 11/13/22 0445 11/12/22  0116   TOTAL PROTEIN 6.6 7.6   ALBUMIN 4.20 4.90   GLOBULIN 2.4  2.7   ALT (SGPT) 10 12   AST (SGOT) 21 23   BILIRUBIN 0.6 0.5   ALK PHOS 66 81   LIPASE  --  23                     Brief Urine Lab Results  (Last result in the past 365 days)      Color   Clarity   Blood   Leuk Est   Nitrite   Protein   CREAT   Urine HCG        11/14/22 1056 Yellow   Clear   Negative   Negative   Negative   Negative               Microbiology Results (last 10 days)     Procedure Component Value - Date/Time    COVID-19,APTIMA PANTHER(DANIKA),[facility], NP/OP SWAB IN UTM/VTM/SALINE TRANSPORT MEDIA,24 HR TAT - Swab, Nasal Cavity [[id number]]  (Normal) Collected: 11/12/22 0948    Lab Status: Final result Specimen: Swab from Nasal Cavity Updated: 11/12/22 1414     COVID19 Not Detected    Narrative:      Fact sheet for providers: https://www.fda.gov/media/091228/download     Fact sheet for patients: https://www.fda.gov/media/355332/download    Test performed by RT PCR.    Influenza Antigen, Rapid - Swab, Nasopharynx [[id number]]  (Normal) Collected: 11/12/22 0948    Lab Status: Final result Specimen: Swab from Nasopharynx Updated: 11/12/22 1041     Influenza A Ag, EIA Negative     Influenza B Ag, EIA Negative          PROCEDURES:    [unfilled]    CT Abdomen Pelvis With Contrast    Result Date: 11/12/2022  Impression:  There is a suspected mechanical small bowel obstruction with a transition zone in the right lower quadrant, probably involving distal ileum.  It may be related to a nonspecific infectious/inflammatory enteritis (such is acute exacerbation of Crohn's enteritis).  Probably minimal, if any, mural thickening of the small bowel is appreciated, however.  No definite CT evidence of an ischemic enteritis.  No pneumoperitoneum or pneumatosis.  No portal or mesenteric venous gas.  No definite intraperitoneal or retroperitoneal abscess.  No acute appendicitis.  Consider close interval clinical and imaging follow-up.  The patient may benefit from administration of oral contrast agent and follow-up  abdominal/pelvic CT examination and/or plain radiographs (KUBs) if clinically warranted.  Please see above comments for further detail.    Please note that portions of this note were completed with a voice recognition program.  CASEY VANEGAS JR, MD       Electronically Signed and Approved By: CASEY VANEGAS JR, MD on 11/12/2022 at 6:56             CT Enterography Abdomen Pelvis w Contrast    Result Date: 11/13/2022  Impression:   1. Dilated fluid-filled small bowel loops.  The small bowel dilatation slightly more than previous exam but this could be due to contrast administered for antrostomy study.  There is a transition point in the right lower quadrant.  I do not see definite significant wall thickening or submucosal Sandra mint but states stricture narrowing due to Crohn's can not be completely excluded on this exam.  There is small bowel along the anterior abdominal wall in front of the transverse colon which can also be associated with adhesions. 2. There is a moderate stool burden. 3. No pneumatosis or free air or significant fluid within the abdomen or pelvis.     NISHI ROMERO MD       Electronically Signed and Approved By: NISHI ROMERO MD on 11/13/2022 at 17:28             XR Abdomen KUB    Result Date: 11/12/2022  Impression:   1. Tip of the enteric tube terminates in the proximal stomach. 2. Persistent gaseous small bowel distention, consistent with small bowel obstruction.     SHON PERKINS MD       Electronically Signed and Approved By: SHON PERKINS MD on 11/12/2022 at 9:31                           Labs Pending at Discharge:        Discharge Details        Discharge Medications      New Medications      Instructions Start Date   famotidine 20 MG tablet  Commonly known as: Pepcid   20 mg, Oral, 2 Times Daily      ondansetron ODT 4 MG disintegrating tablet  Commonly known as: Zofran ODT   4 mg, Translingual, Every 8 Hours PRN      predniSONE 10 MG tablet  Commonly known as: DELTASONE   Take 4  tablets by mouth Daily for 5 days, THEN 4 tablets Daily for 6 days, THEN 2 tablets Daily for 6 days, THEN 1 tablet Daily for 7 days, THEN 0.5 tablets Daily for 7 days.   Start Date: November 15, 2022        Continue These Medications      Instructions Start Date   albuterol sulfate  (90 Base) MCG/ACT inhaler  Commonly known as: PROVENTIL HFA;VENTOLIN HFA;PROAIR HFA   2 puffs, Inhalation, Every 4 Hours PRN      MAGNESIUM DR PO   1 tablet, Oral, Daily      vitamin b complex capsule capsule   1 capsule, Oral, Daily      VITAMIN D PO   1 tablet, Oral, Daily         Stop These Medications    atenolol 25 MG tablet  Commonly known as: TENORMIN     BG-C PO            Allergies   Allergen Reactions   • Haloperidol Other (See Comments)     Tardive dyskenesthia  Foaming at the mouth   • Risperidone Unknown - High Severity   • Trazodone Other (See Comments)     Stuffy nose         Discharge Disposition:    Home or Self Care    Diet:  Regular diet      Discharge Activity:     Activity Instructions     Activity as Tolerated              No future appointments.    Additional Instructions for the Follow-ups that You Need to Schedule     Discharge Follow-up with PCP   As directed       Currently Documented PCP:    Dahlia Del Castillo PA-C    PCP Phone Number:    [phone]     Follow Up Details: Next week         Discharge Follow-up with Specified Provider: Dr Navarrete; 2 Weeks   As directed      To: Dr Navarrete    Follow Up: 2 Weeks               Time spent on Discharge including face to face service 27 minutes.            I have dictated this note utilizing Dragon Dictation.             Please note that portions of this note were completed with a voice recognition program.             Part of this note may be an electronic transcription/translation of spoken language to printed text         using the Dragon Dictation System.       Electronically signed by Luan Montana MD, 11/15/22, 5:42 PM EST.

## 2022-11-16 NOTE — OUTREACH NOTE
Prep Survey    Flowsheet Row Responses   [facility] [employer] facility patient discharged from? [facility]   Is LACE score < 7 ? No   Emergency Room discharge w/ pulse ox? No   Eligibility Readm Mgmt   Discharge diagnosis SBO   Does the patient have one of the following disease processes/diagnoses(primary or secondary)? Other   Does the patient have Home health ordered? No   Is there a DME ordered? No   Medication alerts for this patient see AVS   Prep survey completed? Yes          LOS ORTIZ - Registered Nurse

## 2022-11-30 ENCOUNTER — READMISSION MANAGEMENT (OUTPATIENT)
Dept: CALL CENTER | Facility: HOSPITAL | Age: 37
End: 2022-11-30

## 2022-11-30 NOTE — OUTREACH NOTE
Medical Week 2 Survey    Flowsheet Row Responses   [facility] patient discharged from? [facility]   Does the patient have one of the following disease processes/diagnoses(primary or secondary)? Other   Week 2 attempt successful? Yes   Call start time 1118   Discharge diagnosis SBO   Call end time 1122   Person spoke with today (if not patient) and relationship Mother   Meds reviewed with patient/caregiver? Yes   Is the patient having any side effects they believe may be caused by any medication additions or changes? No   Does the patient have all medications ordered at discharge? Yes   Is the patient taking all medications as directed (includes completed medication regime)? Yes   Does the patient have a primary care provider?  Yes   Does the patient have an appointment with their PCP within 7 days of discharge? Yes   Has the patient kept scheduled appointments due by today? Yes   Has home health visited the patient within 72 hours of discharge? N/A   Psychosocial issues? No   What is the patient's perception of their health status since discharge? Improving   Week 2 Call Completed? Yes          ELSA ORTIZ - Registered Nurse

## 2023-11-30 ENCOUNTER — TRANSCRIBE ORDERS (OUTPATIENT)
Dept: ADMINISTRATIVE | Facility: HOSPITAL | Age: 38
End: 2023-11-30
Payer: MEDICARE

## 2023-11-30 DIAGNOSIS — R91.8 LUNG NODULES: Primary | ICD-10-CM

## 2023-12-07 ENCOUNTER — HOSPITAL ENCOUNTER (OUTPATIENT)
Dept: CT IMAGING | Facility: HOSPITAL | Age: 38
Discharge: HOME | End: 2023-12-07
Admitting: PHYSICIAN ASSISTANT
Payer: MEDICARE

## 2023-12-07 DIAGNOSIS — R91.8 LUNG NODULES: ICD-10-CM

## 2023-12-07 PROCEDURE — 71250 CT THORAX DX C-: CPT

## 2023-12-15 ENCOUNTER — OFFICE VISIT (OUTPATIENT)
Dept: CARDIOLOGY | Facility: CLINIC | Age: 38
End: 2023-12-15
Payer: MEDICARE

## 2023-12-15 VITALS
BODY MASS INDEX: 18.81 KG/M2 | HEART RATE: 81 BPM | DIASTOLIC BLOOD PRESSURE: 66 MMHG | HEIGHT: 69 IN | SYSTOLIC BLOOD PRESSURE: 106 MMHG | WEIGHT: 127 LBS

## 2023-12-15 DIAGNOSIS — R06.09 DYSPNEA ON EXERTION: ICD-10-CM

## 2023-12-15 DIAGNOSIS — G90.A POTS (POSTURAL ORTHOSTATIC TACHYCARDIA SYNDROME): Primary | ICD-10-CM

## 2023-12-15 PROBLEM — R00.0 TACHYCARDIA: Status: RESOLVED | Noted: 2021-11-17 | Resolved: 2023-12-15

## 2023-12-15 RX ORDER — ATENOLOL 25 MG/1
TABLET ORAL
COMMUNITY

## 2023-12-15 RX ORDER — LORAZEPAM 1 MG/1
1 TABLET ORAL
COMMUNITY
Start: 2023-11-29 | End: 2023-12-25

## 2023-12-15 NOTE — PROGRESS NOTES
Chief Complaint  Follow-up    Subjective            History of Present Illness  Feliberto Santillan is a 38-year-old white/ male patient who presents to the office today for follow-up.  He has POTS and is currently prescribed atenolol 25 mg twice daily.  He admits that this medication helps to manage his tachycardia.  He reports chronic shortness of breath with exertion that has been progressively worsening over the last few years.  He denies any chest pain.    PMH  Past Medical History:   Diagnosis Date    Anxiety     Asthma     Hypertension     POTS (postural orthostatic tachycardia syndrome)          ALLERGY  Allergies   Allergen Reactions    Haloperidol Other (See Comments)     Tardive dyskenesthia  Foaming at the mouth    Risperidone Unknown - High Severity    Trazodone Other (See Comments)     Stuffy nose          SURGICALHX  History reviewed. No pertinent surgical history.       SOC  Social History     Socioeconomic History    Marital status: Single   Tobacco Use    Smoking status: Never    Smokeless tobacco: Current     Types: Snuff   Vaping Use    Vaping Use: Never used   Substance and Sexual Activity    Alcohol use: Yes     Comment: 6 PACK BEER PER WEEK    Drug use: Not Currently     Types: Marijuana    Sexual activity: Defer         FAMHX  Family History   Problem Relation Age of Onset    Thyroid disease Mother     Asthma Mother     Hyperlipidemia Mother     Diabetes Father     Hypertension Father     Heart disease Father           MEDSIGONLY  Current Outpatient Medications on File Prior to Visit   Medication Sig    albuterol (PROVENTIL HFA;VENTOLIN HFA) 108 (90 Base) MCG/ACT inhaler Inhale 2 puffs Every 4 (Four) Hours As Needed for Wheezing.    atenolol (TENORMIN) 25 MG tablet 1 tab(s) orally 2 times daily for 30 day(s)    B Complex Vitamins (vitamin b complex) capsule capsule Take 1 capsule by mouth Daily.    LORazepam (ATIVAN) 1 MG tablet 1 tablet.    Magnesium Chloride (MAGNESIUM DR PO) Take 1  "tablet by mouth Daily.    VITAMIN D PO Take 1 tablet by mouth Daily.     No current facility-administered medications on file prior to visit.         Objective   /66   Pulse 81   Ht 175.3 cm (69\")   Wt 57.6 kg (127 lb)   BMI 18.75 kg/m²       Physical Exam  Constitutional:       Appearance: He is normal weight.   HENT:      Head: Normocephalic.   Neck:      Vascular: No carotid bruit.   Cardiovascular:      Rate and Rhythm: Normal rate and regular rhythm.      Pulses: Normal pulses.      Heart sounds: Normal heart sounds. No murmur heard.  Pulmonary:      Effort: Pulmonary effort is normal.      Breath sounds: Normal breath sounds.   Musculoskeletal:      Cervical back: Neck supple.      Right lower leg: No edema.      Left lower leg: No edema.   Skin:     General: Skin is dry.   Neurological:      Mental Status: He is alert and oriented to person, place, and time.   Psychiatric:         Behavior: Behavior normal.       Result Review :   The following data was reviewed by: CE Damon on 12/15/2023:  proBNP   Date Value Ref Range Status   11/23/2021 32.1 0.0 - 450.0 pg/mL Final      Lab Results   Component Value Date    TSH 1.500 06/26/2021      Lab Results   Component Value Date    FREET4 1.53 06/26/2021      D-Dimer, Quantitative   Date Value Ref Range Status   09/02/2018 <0.27 0.00 - 0.49 MCGFEU/mL Final     Magnesium   Date Value Ref Range Status   06/26/2021 2.1 1.6 - 2.6 mg/dL Final      No results found for: \"DIGOXIN\"   Lab Results   Component Value Date    TROPONINT <0.010 04/09/2022                  Assessment and Plan    Diagnoses and all orders for this visit:    1. POTS (postural orthostatic tachycardia syndrome) (Primary)  Symptomatically stable at this time, continue atenolol 25 mg twice daily.  Advised adequate fluid hydration.    2. Dyspnea on exertion  Obtain stress test to rule out ischemic cause.  Obtain echocardiogram to assess left ventricular systolic function and valvular " function.  -     Stress Test With Myocardial Perfusion One Day; Future  -     Adult Transthoracic Echo Complete W/ Cont if Necessary Per Protocol; Future            Follow Up   Return in about 1 year (around 12/15/2024) for Follow up with Dr Willis.    Patient was given instructions and counseling regarding his condition or for health maintenance advice. Please see specific information pulled into the AVS if appropriate.     Feliberto Santillan  reports that he has never smoked. His smokeless tobacco use includes snuff.           Brisa Drake, APRN  12/15/23  09:28 EST    Dictated Utilizing Dragon Dictation

## 2023-12-25 ENCOUNTER — HOSPITAL ENCOUNTER (EMERGENCY)
Facility: HOSPITAL | Age: 38
Discharge: HOME | End: 2023-12-26
Attending: EMERGENCY MEDICINE | Admitting: EMERGENCY MEDICINE
Payer: MEDICARE

## 2023-12-25 ENCOUNTER — APPOINTMENT (OUTPATIENT)
Dept: GENERAL RADIOLOGY | Facility: HOSPITAL | Age: 38
End: 2023-12-25
Payer: MEDICARE

## 2023-12-25 DIAGNOSIS — F45.8 ANXIETY HYPERVENTILATION: Primary | ICD-10-CM

## 2023-12-25 DIAGNOSIS — F41.9 ANXIETY HYPERVENTILATION: Primary | ICD-10-CM

## 2023-12-25 LAB
ALBUMIN SERPL-MCNC: 4.9 G/DL (ref 3.5–5.2)
ALBUMIN/GLOB SERPL: 1.8 G/DL
ALP SERPL-CCNC: 67 U/L (ref 39–117)
ALT SERPL W P-5'-P-CCNC: 13 U/L (ref 1–41)
ANION GAP SERPL CALCULATED.3IONS-SCNC: 16.1 MMOL/L (ref 5–15)
AST SERPL-CCNC: 22 U/L (ref 1–40)
BASOPHILS # BLD AUTO: 0.05 10*3/MM3 (ref 0–0.2)
BASOPHILS NFR BLD AUTO: 0.6 % (ref 0–1.5)
BILIRUB SERPL-MCNC: 0.8 MG/DL (ref 0–1.2)
BUN SERPL-MCNC: 5 MG/DL (ref 6–20)
BUN/CREAT SERPL: 5.7 (ref 7–25)
CALCIUM SPEC-SCNC: 10.7 MG/DL (ref 8.6–10.5)
CHLORIDE SERPL-SCNC: 102 MMOL/L (ref 98–107)
CO2 SERPL-SCNC: 22.9 MMOL/L (ref 22–29)
CREAT SERPL-MCNC: 0.87 MG/DL (ref 0.76–1.27)
DEPRECATED RDW RBC AUTO: 41.7 FL (ref 37–54)
EGFRCR SERPLBLD CKD-EPI 2021: 114.7 ML/MIN/1.73
EOSINOPHIL # BLD AUTO: 0.02 10*3/MM3 (ref 0–0.4)
EOSINOPHIL NFR BLD AUTO: 0.3 % (ref 0.3–6.2)
ERYTHROCYTE [DISTWIDTH] IN BLOOD BY AUTOMATED COUNT: 13.3 % (ref 12.3–15.4)
GLOBULIN UR ELPH-MCNC: 2.7 GM/DL
GLUCOSE SERPL-MCNC: 86 MG/DL (ref 65–99)
HCT VFR BLD AUTO: 38.9 % (ref 37.5–51)
HGB BLD-MCNC: 13.1 G/DL (ref 13–17.7)
HOLD SPECIMEN: NORMAL
HOLD SPECIMEN: NORMAL
IMM GRANULOCYTES # BLD AUTO: 0.03 10*3/MM3 (ref 0–0.05)
IMM GRANULOCYTES NFR BLD AUTO: 0.4 % (ref 0–0.5)
LYMPHOCYTES # BLD AUTO: 0.91 10*3/MM3 (ref 0.7–3.1)
LYMPHOCYTES NFR BLD AUTO: 11.7 % (ref 19.6–45.3)
MCH RBC QN AUTO: 28.8 PG (ref 26.6–33)
MCHC RBC AUTO-ENTMCNC: 33.7 G/DL (ref 31.5–35.7)
MCV RBC AUTO: 85.5 FL (ref 79–97)
MONOCYTES # BLD AUTO: 0.51 10*3/MM3 (ref 0.1–0.9)
MONOCYTES NFR BLD AUTO: 6.6 % (ref 5–12)
NEUTROPHILS NFR BLD AUTO: 6.24 10*3/MM3 (ref 1.7–7)
NEUTROPHILS NFR BLD AUTO: 80.4 % (ref 42.7–76)
NRBC BLD AUTO-RTO: 0 /100 WBC (ref 0–0.2)
NT-PROBNP SERPL-MCNC: 66 PG/ML (ref 0–450)
PLATELET # BLD AUTO: 260 10*3/MM3 (ref 140–450)
PMV BLD AUTO: 10.7 FL (ref 6–12)
POTASSIUM SERPL-SCNC: 3.9 MMOL/L (ref 3.5–5.2)
PROT SERPL-MCNC: 7.6 G/DL (ref 6–8.5)
RBC # BLD AUTO: 4.55 10*6/MM3 (ref 4.14–5.8)
SODIUM SERPL-SCNC: 141 MMOL/L (ref 136–145)
TROPONIN T SERPL HS-MCNC: <6 NG/L
WBC NRBC COR # BLD AUTO: 7.76 10*3/MM3 (ref 3.4–10.8)
WHOLE BLOOD HOLD COAG: NORMAL
WHOLE BLOOD HOLD SPECIMEN: NORMAL

## 2023-12-25 PROCEDURE — 80053 COMPREHEN METABOLIC PANEL: CPT

## 2023-12-25 PROCEDURE — 71045 X-RAY EXAM CHEST 1 VIEW: CPT

## 2023-12-25 PROCEDURE — 84484 ASSAY OF TROPONIN QUANT: CPT

## 2023-12-25 PROCEDURE — 99284 EMERGENCY DEPT VISIT MOD MDM: CPT

## 2023-12-25 PROCEDURE — 96374 THER/PROPH/DIAG INJ IV PUSH: CPT

## 2023-12-25 PROCEDURE — 83880 ASSAY OF NATRIURETIC PEPTIDE: CPT

## 2023-12-25 PROCEDURE — 25010000002 LORAZEPAM PER 2 MG: Performed by: EMERGENCY MEDICINE

## 2023-12-25 PROCEDURE — 93005 ELECTROCARDIOGRAM TRACING: CPT

## 2023-12-25 PROCEDURE — 85025 COMPLETE CBC W/AUTO DIFF WBC: CPT

## 2023-12-25 PROCEDURE — 36415 COLL VENOUS BLD VENIPUNCTURE: CPT

## 2023-12-25 PROCEDURE — 93005 ELECTROCARDIOGRAM TRACING: CPT | Performed by: EMERGENCY MEDICINE

## 2023-12-25 RX ORDER — SODIUM CHLORIDE 0.9 % (FLUSH) 0.9 %
10 SYRINGE (ML) INJECTION AS NEEDED
Status: DISCONTINUED | OUTPATIENT
Start: 2023-12-25 | End: 2023-12-26 | Stop reason: HOSPADM

## 2023-12-25 RX ORDER — LORAZEPAM 1 MG/1
1 TABLET ORAL EVERY 8 HOURS PRN
Qty: 12 TABLET | Refills: 0 | Status: SHIPPED | OUTPATIENT
Start: 2023-12-25

## 2023-12-25 RX ORDER — LORAZEPAM 2 MG/ML
1 INJECTION INTRAMUSCULAR ONCE
Status: COMPLETED | OUTPATIENT
Start: 2023-12-25 | End: 2023-12-25

## 2023-12-25 RX ADMIN — LORAZEPAM 1 MG: 2 INJECTION INTRAMUSCULAR; INTRAVENOUS at 22:26

## 2023-12-25 NOTE — Clinical Note
[facility] EMERGENCY ROOM  [address]  [place] [address]  Phone: [phone]    Feliberto Santillan was seen and treated in our emergency department on 12/25/2023.  He may return to work on 12/27/2023.         Thank you for choosing [facility].    Kevin Lozoya MD

## 2023-12-26 VITALS
OXYGEN SATURATION: 100 % | WEIGHT: 121.69 LBS | HEART RATE: 60 BPM | HEIGHT: 69 IN | TEMPERATURE: 98.8 F | DIASTOLIC BLOOD PRESSURE: 64 MMHG | SYSTOLIC BLOOD PRESSURE: 93 MMHG | RESPIRATION RATE: 22 BRPM | BODY MASS INDEX: 18.02 KG/M2

## 2023-12-26 NOTE — ED PROVIDER NOTES
Time: 10:21 PM EST  Date of encounter:  12/25/2023  Independent Historian/Clinical History and Information was obtained by:   Patient    History is limited by: N/A    Chief Complaint: Shortness of breath x 3 days          History of Present Illness:  Patient is a 38 y.o. year old male with history of anxiety who presents to the emergency department for evaluation of shortness of breath over the past 3 days.    He does also have remote history of asthma but denies any wheezing.    He denies any fevers or chills he did have some upset stomach and sore throat earlier.    He declined being tested for COVID and flu.    HPI    Patient Care Team  Primary Care Provider: Dahlia Del Castillo PA-C    Past Medical History:     Allergies   Allergen Reactions    Haloperidol Other (See Comments)     Tardive dyskenesthia  Foaming at the mouth    Risperidone Unknown - High Severity    Trazodone Other (See Comments)     Stuffy nose     Past Medical History:   Diagnosis Date    Anxiety     Asthma     Hypertension     POTS (postural orthostatic tachycardia syndrome)      History reviewed. No pertinent surgical history.  Family History   Problem Relation Age of Onset    Thyroid disease Mother     Asthma Mother     Hyperlipidemia Mother     Diabetes Father     Hypertension Father     Heart disease Father        Home Medications:  Prior to Admission medications    Medication Sig Start Date End Date Taking? Authorizing Provider   albuterol (PROVENTIL HFA;VENTOLIN HFA) 108 (90 Base) MCG/ACT inhaler Inhale 2 puffs Every 4 (Four) Hours As Needed for Wheezing.    Cici Montenegro MD   atenolol (TENORMIN) 25 MG tablet 1 tab(s) orally 2 times daily for 30 day(s)    Cici Montenegro MD   B Complex Vitamins (vitamin b complex) capsule capsule Take 1 capsule by mouth Daily.    Cici Montenegro MD   LORazepam (ATIVAN) 1 MG tablet 1 tablet. 11/29/23   Cici Montenegro MD   Magnesium Chloride (MAGNESIUM DR PO) Take 1 tablet by mouth  "Daily.    Provider, Historical, MD   VITAMIN D PO Take 1 tablet by mouth Daily.    Provider, Historical, MD        Social History:   Social History     Tobacco Use    Smoking status: Never    Smokeless tobacco: Current     Types: Snuff   Vaping Use    Vaping Use: Never used   Substance Use Topics    Alcohol use: Yes     Comment: 6 PACK BEER PER WEEK    Drug use: Not Currently     Types: Marijuana         Review of Systems:  Review of Systems   I performed a 10 point review of systems which was all negative, except for the positives found in the HPI above.      Physical Exam:  /92 (BP Location: Left arm, Patient Position: Sitting)   Pulse 98   Temp 98.8 °F (37.1 °C) (Oral)   Resp 22   Ht 175.3 cm (69\")   Wt 55.2 kg (121 lb 11.1 oz)   SpO2 97%   BMI 17.97 kg/m²         Physical Exam   General: Awake alert and in mild distress, with anxiety hyperventilation    HEENT: Head normocephalic atraumatic, eyes PERRLA EOMI, nose normal, oropharynx normal.    Neck: Supple full range of motion, no meningismus, no lymphadenopathy    Heart: Regular rate and rhythm, no murmurs or rubs, 2+ radial pulses bilaterally    Lungs: Clear to auscultation bilaterally without wheezes or crackles, no respiratory distress    Abdomen: Soft, nontender, nondistended, no rebound or guarding    Skin: Warm, dry, no rash    Musculoskeletal: Normal range of motion, no lower extremity edema    Neurologic: Oriented x3, no motor deficits no sensory deficits    Psychiatric: Mood appears anxious, hyperventilating, no psychosis          Procedures:  Procedures      Medical Decision Making:      Comorbidities that affect care:    Anxiety, Asthma    External Notes reviewed:    None      The following orders were placed and all results were independently analyzed by me:  Orders Placed This Encounter   Procedures    COVID-19, FLU A/B, RSV PCR 1 HR TAT - Swab, Nasopharynx    XR Chest 1 View    [place] Draw    Comprehensive Metabolic Panel    BNP    " Single High Sensitivity Troponin T    CBC Auto Differential    NPO Diet NPO Type: Strict NPO    Undress & Gown    Continuous Pulse Oximetry    Vital Signs    Oxygen Therapy- Nasal Cannula; Titrate 1-6 LPM Per SpO2; 90 - 95%    ECG 12 Lead ED Triage Standing Order; SOA    Insert Peripheral IV    CBC & Differential    Green Top (Gel)    Lavender Top    Gold Top - SST    Light Blue Top       Medications Given in the Emergency Department:  Medications   sodium chloride 0.9 % flush 10 mL (has no administration in time range)   LORazepam (ATIVAN) injection 1 mg (1 mg Intravenous Given 12/25/23 2226)        ED Course:    ED Course as of 12/25/23 2231   Mon Dec 25, 2023   2131 EKG: I interpreted his twelve-lead EKG is normal sinus rhythm at 83 bpm, normal P waves, normal QRS, normal ST segments and T waves; no acute ischemia or ectopy. [VS]      ED Course User Index  [VS] Kevin Lozoya MD       Labs:    Lab Results (last 24 hours)       Procedure Component Value Units Date/Time    CBC & Differential [[id number]]  (Abnormal) Collected: 12/25/23 1945    Specimen: Blood Updated: 12/25/23 1949    Narrative:      The following orders were created for panel order CBC & Differential.  Procedure                               Abnormality         Status                     ---------                               -----------         ------                     CBC Auto Differential[[id number]]        Abnormal            Final result                 Please view results for these tests on the individual orders.    Comprehensive Metabolic Panel [[id number]]  (Abnormal) Collected: 12/25/23 1945    Specimen: Blood Updated: 12/25/23 2016     Glucose 86 mg/dL      BUN 5 mg/dL      Creatinine 0.87 mg/dL      Sodium 141 mmol/L      Potassium 3.9 mmol/L      Chloride 102 mmol/L      CO2 22.9 mmol/L      Calcium 10.7 mg/dL      Total Protein 7.6 g/dL      Albumin 4.9 g/dL      ALT (SGPT) 13 U/L      AST (SGOT) 22 U/L      Alkaline Phosphatase 67  U/L      Total Bilirubin 0.8 mg/dL      Globulin 2.7 gm/dL      A/G Ratio 1.8 g/dL      BUN/Creatinine Ratio 5.7     Anion Gap 16.1 mmol/L      eGFR 114.7 mL/min/1.73     Narrative:      GFR Normal >60  Chronic Kidney Disease <60  Kidney Failure <15      BNP [[id number]]  (Normal) Collected: 12/25/23 1945    Specimen: Blood Updated: 12/25/23 2014     proBNP 66.0 pg/mL     Narrative:      This assay is used as an aid in the diagnosis of individuals suspected of having heart failure. It can be used as an aid in the diagnosis of acute decompensated heart failure (ADHF) in patients presenting with signs and symptoms of ADHF to the emergency department (ED). In addition, NT-proBNP of <300 pg/mL indicates ADHF is not likely.    Age Range Result Interpretation  NT-proBNP Concentration (pg/mL:      <50             Positive            >450                   Gray                 300-450                    Negative             <300    50-75           Positive            >900                  Gray                300-900                  Negative            <300      >75             Positive            >1800                  Gray                300-1800                  Negative            <300    Single High Sensitivity Troponin T [[id number]]  (Normal) Collected: 12/25/23 1945    Specimen: Blood Updated: 12/25/23 2016     HS Troponin T <6 ng/L     Narrative:      High Sensitive Troponin T Reference Range:  <14.0 ng/L- Negative Female for AMI  <22.0 ng/L- Negative Male for AMI  >=14 - Abnormal Female indicating possible myocardial injury.  >=22 - Abnormal Male indicating possible myocardial injury.   Clinicians would have to utilize clinical acumen, EKG, Troponin, and serial changes to determine if it is an Acute Myocardial Infarction or myocardial injury due to an underlying chronic condition.         CBC Auto Differential [[id number]]  (Abnormal) Collected: 12/25/23 1945    Specimen: Blood Updated: 12/25/23 1949     WBC 7.76  10*3/mm3      RBC 4.55 10*6/mm3      Hemoglobin 13.1 g/dL      Hematocrit 38.9 %      MCV 85.5 fL      MCH 28.8 pg      MCHC 33.7 g/dL      RDW 13.3 %      RDW-SD 41.7 fl      MPV 10.7 fL      Platelets 260 10*3/mm3      Neutrophil % 80.4 %      Lymphocyte % 11.7 %      Monocyte % 6.6 %      Eosinophil % 0.3 %      Basophil % 0.6 %      Immature Grans % 0.4 %      Neutrophils, Absolute 6.24 10*3/mm3      Lymphocytes, Absolute 0.91 10*3/mm3      Monocytes, Absolute 0.51 10*3/mm3      Eosinophils, Absolute 0.02 10*3/mm3      Basophils, Absolute 0.05 10*3/mm3      Immature Grans, Absolute 0.03 10*3/mm3      nRBC 0.0 /100 WBC              Imaging:    XR Chest 1 View    Result Date: 12/25/2023  PROCEDURE: XR CHEST 1 VW  COMPARISON: [facility], CT, CT CHEST [facility] DIAGNOSTIC, 12/07/2023, 15:18.  INDICATIONS: SOA Triage Protocol  FINDINGS:  LUNGS: Normal.  No significant pulmonary parenchymal abnormalities.  VASCULATURE: Normal.  Unremarkable pulmonary vasculature.  CARDIAC: Normal.  No cardiac silhouette abnormality or cardiomegaly.  MEDIASTINUM: Normal.  No visible mass or adenopathy.  PLEURA: Normal.  No effusion or pleural thickening.  BONES: Normal.  No fracture or visible bony lesion.  OTHER: Negative.        No acute cardiopulmonary process identified.       LUIS NIXON MD       Electronically Signed and Approved By: LUIS NIXON MD on 12/25/2023 at 19:35                Differential Diagnosis and Discussion:    Dyspnea: Differential diagnosis includes but is not limited to metabolic acidosis, neurological disorders, psychogenic, asthma, pneumothorax, upper airway obstruction, COPD, pneumonia, noncardiogenic pulmonary edema, interstitial lung disease, anemia, congestive heart failure, and pulmonary embolism    All labs were reviewed and interpreted by me.  All X-rays impressions were independently interpreted by me.  EKG was interpreted by me.    MDM     Amount and/or Complexity  of Data Reviewed  Clinical lab tests: reviewed  Tests in the radiology section of CPT®: reviewed  Tests in the medicine section of CPT®: reviewed           This patient is a 38-year-old male with previous history of asthma and anxiety now presenting with shortness of breath.    His lung fields are clear and he is oxygenating at 100% on room air but just mildly hyperventilating and having difficulty sitting still.    He denied any substance abuse or amphetamine use.    I am giving him some Ativan here to calm him down I think he has anxiety hyperventilation.    All of his workup here was unremarkable including normal white blood cell count of 7, no signs of sepsis, normal hemoglobin and negative troponin and proBNP.    EKG and chest x-ray look normal.                  Patient Care Considerations:    PERC: I used the PERC score to risk stratify the patient for PE and a CT of the chest was considered but ultimately not indicated in today's visit.      Consultants/Shared Management Plan:        Social Determinants of Health:    Patient is independent, reliable, and has access to care.       Disposition and Care Coordination:    Discharged: The patient is suitable and stable for discharge with no need for consideration of observation or admission.    I have explained the patient´s condition, diagnoses and treatment plan based on the information available to me at this time. I have answered questions and addressed any concerns. The patient has a good  understanding of the patient´s diagnosis, condition, and treatment plan as can be expected at this point. The vital signs have been stable. The patient´s condition is stable and appropriate for discharge from the emergency department.      The patient will pursue further outpatient evaluation with the primary care physician or other designated or consulting physician as outlined in the discharge instructions. They are agreeable to this plan of care and follow-up instructions  have been explained in detail. The patient has received these instructions in written format and have expressed an understanding of the discharge instructions. The patient is aware that any significant change in condition or worsening of symptoms should prompt an immediate return to this or the closest emergency department or call to 1.  I have explained discharge medications and the need for follow up with the patient/caretakers. This was also printed in the discharge instructions. Patient was discharged with the following medications and follow up:      Medication List        Changed      LORazepam 1 MG tablet  Commonly known as: Ativan  Take 1 tablet by mouth Every 8 (Eight) Hours As Needed for Anxiety.  What changed:   how to take this  when to take this  reasons to take this               Where to Get Your Medications        These medications were sent to [id number] - [place], [place] - [address] AT [facility] - [phone] [facility] [phone]   [address] [facility] [place] [facility] [place] [address]      Phone: [phone]   LORazepam 1 MG tablet      Dahlia Del Castillo, OSCAR  [address] [facility] [address]  [phone]    Call in 1 day  for a follow-up appointment       Final diagnoses:   Anxiety hyperventilation        ED Disposition       ED Disposition   Discharge    Condition   Stable    Comment   --               This medical record created using voice recognition software.             Kevin Lozoya MD  12/25/23 2423

## 2023-12-26 NOTE — DISCHARGE INSTRUCTIONS
All of your blood work and chest x-ray and EKG of the heart looked normal.    No signs of sepsis or infection are seen.    It sounds like you are having basically anxiety attack causing your trouble breathing.    Please follow-up with your primary care doctor to talk about further plan of management for this condition.

## 2023-12-29 LAB
QT INTERVAL: 379 MS
QTC INTERVAL: 447 MS

## 2024-02-05 ENCOUNTER — TRANSCRIBE ORDERS (OUTPATIENT)
Dept: ADMINISTRATIVE | Facility: HOSPITAL | Age: 39
End: 2024-02-05
Payer: MEDICARE

## 2024-02-05 DIAGNOSIS — E04.1 THYROID NODULE: Primary | ICD-10-CM

## 2024-02-05 DIAGNOSIS — E04.1 CYST OF THYROID: Primary | ICD-10-CM

## 2025-05-17 ENCOUNTER — PATIENT ROUNDING (BHMG ONLY) (OUTPATIENT)
Dept: URGENT CARE | Facility: CLINIC | Age: 40
End: 2025-05-17
Payer: MEDICARE

## 2025-05-17 NOTE — ED NOTES
Thank you for letting us care for you in your recent visit to our urgent care center. We would love to hear about your experience with us. Was this the first time you have visited our location?    We’re always looking for ways to make our patients’ experiences even better. Do you have any recommendations on ways we may improve?     I appreciate you taking the time to respond. Please be on the lookout for a survey about your recent visit from [facility] via text or email. We would greatly appreciate if you could fill that out and turn it back in. We want your voice to be heard and we value your feedback.   Thank you for choosing [facility] for your healthcare needs.